# Patient Record
Sex: FEMALE | Race: WHITE | NOT HISPANIC OR LATINO | Employment: UNEMPLOYED | URBAN - METROPOLITAN AREA
[De-identification: names, ages, dates, MRNs, and addresses within clinical notes are randomized per-mention and may not be internally consistent; named-entity substitution may affect disease eponyms.]

---

## 2017-02-07 ENCOUNTER — TELEPHONE (OUTPATIENT)
Dept: NEUROLOGY | Facility: CLINIC | Age: 42
End: 2017-02-07

## 2017-02-07 DIAGNOSIS — Z79.899 HIGH RISK MEDICATION USE: ICD-10-CM

## 2017-02-07 DIAGNOSIS — G35 MULTIPLE SCLEROSIS: Primary | ICD-10-CM

## 2017-02-07 NOTE — TELEPHONE ENCOUNTER
----- Message from RODRIGUE Baldwin, CNS sent at 2/7/2017  1:09 PM CST -----  TG9=[id number]. Ok to continue Tecfidera. No CBC was done. She will need to do this locally. We can mail order to her. I will send her a message to let her know.

## 2017-02-10 ENCOUNTER — DOCUMENTATION ONLY (OUTPATIENT)
Dept: NEUROLOGY | Facility: CLINIC | Age: 42
End: 2017-02-10

## 2017-02-24 DIAGNOSIS — M79.2 NEUROPATHIC PAIN: ICD-10-CM

## 2017-02-27 RX ORDER — PREGABALIN 100 MG/1
CAPSULE ORAL
Qty: 150 CAPSULE | Refills: 5 | Status: SHIPPED | OUTPATIENT
Start: 2017-02-27 | End: 2017-09-14 | Stop reason: SDUPTHER

## 2017-03-15 DIAGNOSIS — G35 MULTIPLE SCLEROSIS: ICD-10-CM

## 2017-03-15 RX ORDER — DIMETHYL FUMARATE 240 MG/1
CAPSULE ORAL
Status: CANCELLED | OUTPATIENT
Start: 2017-03-15

## 2017-03-28 DIAGNOSIS — G35 MULTIPLE SCLEROSIS: ICD-10-CM

## 2017-03-28 NOTE — TELEPHONE ENCOUNTER
Call returned to pt. Informed her that CBC wasn't done at her last appt (lab wasn't linked). She doesn't remember receiving CBC orders in February. [facility] [place] [facility]. Will fax orders to that location once signed.

## 2017-03-28 NOTE — TELEPHONE ENCOUNTER
----- Message from Thuy Sofia sent at 3/28/2017  2:52 PM CDT -----  Contact: Patient [phone]  Patient says she is returning Rocio's call from a few weeks ago and she was not sure as to what the call was about. Please call

## 2017-04-05 RX ORDER — DIMETHYL FUMARATE 240 MG/1
1 CAPSULE ORAL 2 TIMES DAILY
Qty: 60 CAPSULE | Refills: 5 | Status: SHIPPED | OUTPATIENT
Start: 2017-04-05 | End: 2017-10-11 | Stop reason: SDUPTHER

## 2017-04-05 NOTE — TELEPHONE ENCOUNTER
----- Message from Lucio Felton sent at 4/5/2017  3:46 PM CDT -----  Contact: Self [phone]  Patient is calling to get an update on the refill request ( TECFIDERA 240 mg CpDR ) patient is out of medication     [facility] - [place], [place] - [address]  [address]  [facility] [address]  Phone: [phone] Fax: [phone]

## 2017-04-05 NOTE — TELEPHONE ENCOUNTER
[facility] CBC results. Results received via fax and shown to Natalie (ALC [id number]). Ok to call in.    Call placed to [facility] and spoke with Leslie pharmacist. Rx called in as prescribed.    Call placed to Lashell and informed her of the above.

## 2017-04-06 ENCOUNTER — DOCUMENTATION ONLY (OUTPATIENT)
Dept: NEUROLOGY | Facility: CLINIC | Age: 42
End: 2017-04-06

## 2017-04-28 ENCOUNTER — OFFICE VISIT (OUTPATIENT)
Dept: NEUROLOGY | Facility: CLINIC | Age: 42
End: 2017-04-28
Payer: COMMERCIAL

## 2017-04-28 ENCOUNTER — TELEPHONE (OUTPATIENT)
Dept: NEUROLOGY | Facility: CLINIC | Age: 42
End: 2017-04-28

## 2017-04-28 VITALS
DIASTOLIC BLOOD PRESSURE: 84 MMHG | BODY MASS INDEX: 31.58 KG/M2 | HEIGHT: 64 IN | SYSTOLIC BLOOD PRESSURE: 122 MMHG | HEART RATE: 78 BPM | WEIGHT: 185 LBS

## 2017-04-28 DIAGNOSIS — Z79.899 HIGH RISK MEDICATION USE: ICD-10-CM

## 2017-04-28 DIAGNOSIS — Z71.89 COUNSELING REGARDING GOALS OF CARE: ICD-10-CM

## 2017-04-28 DIAGNOSIS — G35 MULTIPLE SCLEROSIS: Primary | ICD-10-CM

## 2017-04-28 DIAGNOSIS — Z29.89 PROPHYLACTIC IMMUNOTHERAPY: ICD-10-CM

## 2017-04-28 PROCEDURE — 99215 OFFICE O/P EST HI 40 MIN: CPT | Mod: S$GLB,,, | Performed by: CLINICAL NURSE SPECIALIST

## 2017-04-28 PROCEDURE — 99999 PR PBB SHADOW E&M-EST. PATIENT-LVL III: CPT | Mod: PBBFAC,,, | Performed by: CLINICAL NURSE SPECIALIST

## 2017-04-28 PROCEDURE — 1160F RVW MEDS BY RX/DR IN RCRD: CPT | Mod: S$GLB,,, | Performed by: CLINICAL NURSE SPECIALIST

## 2017-04-28 NOTE — PROGRESS NOTES
"Subjective:       Patient ID: Lashell Gonzalez is a 42 y.o. female who presents today for a routine clinic visit for MS. She was last seen in December. The history has been provided by the patient.     MS HPI:  · DMT:  Tecfidera 240mg twice daily, occassional flushing   · Side effects from DMT? No  · Taking vitamin D3 as recommended? Yes - 50,000 units twice a week.   · She has had more headaches lately. She is not sure if these are related to her menstrual cycle or allergies. She takes Claritin as needed.   · She is still seeing pain management and getting trigger point injections in her back and hip.   · She uses a Fitbit to track her walking.   · She has felt more tired than usual. She stopped taking her iron pills because her hematologist left his practice and she has not found a new one yet.     SOCIAL HISTORY  Social History   Substance Use Topics    Smoking status: Never Smoker    Smokeless tobacco: None    Alcohol use No     Living arrangements - the patient lives with her family.  Employment: She is working part-time at a physical therapy office     MS ROS:  · Fatigue: Yes - She has been more tired than usual, but is   · Sleep Disturbance: Yes - She takes Ambien. She has a hard time "shutting off" her brain at night. She has not seen a sleep doctor yet. She plans to follow up on this. She is not currently seeing CPAP.   · Bladder Dysfunction: No  · Bowel Dysfunction: Yes - She has history of gastric ulcers and plans to see her gastro doctor soon. She has loose stool and gas. This might be related to Tecfidera. She takes antacids and anti-diarrheals as needed.   · Spasticity: Yes - She has muscle spasms in the left hip and occasional twitching in the shoulders. She has a squeezing sensation in her shoulders and upper back. She takes tizanidine 6mg-8mg at bedtime. Tizanidine causes dry mouth.   · Visual Symptoms: No. She feels that the right eye is getting weaker. She plans to see her eye doctor soon. "   · Cognitive: Yes - She forgets things frequently, and she has trouble with word finding. She keeps reminders in her phone. She was having trouble learning some things at work, but with repetition, this seems to be getting better.   · Mood Disorder: No  · Gait Disturbance/Falls: Yes. Walking is ok, but had a fall while hiking recently. She lost her balance and slipped; she was also trying to walk around her dogs at the time. She walks into doorways sometimes.   · Hand Dysfunction: Yes - She has some weakness in the fingers and has some reduced  strength in the right hand. She tries to be more mindful of when she holds things. She drops her phone often.   · Pain: Yes - As above. --She takes Hydrocodone, Cymbalta, Lyrica, and tizanidine at night.  · Sexual Dysfunction: Not Assessed  · Skin Breakdown: Yes - She has some scratches on her legs from recent fall.   · Tremors: No.   · Dysphagia:  No  · Dysarthria:  Yes - She stutters at times.   · Heat sensitivity:  Yes - She gets very tired if she gets overheated. Heat intensifies any symptoms she may be feeling at a certain time. She has not sent in the paperwork for the cooling vest.   · Any un-met adaptive needs? No  · Copay Assist?  Yes - $0 copay for Tecfidera   · Clinical Trial candidate? No      Research:   CHORDS candidate? No          Objective:        1. 25 foot timed walk: 4.38 seconds today without assist; was 4.2 seconds at last visit without assist     Neurologic Exam     Mental Status   Oriented to person, place, and time.   Attention: normal. Concentration: normal.   Speech: speech is normal   Level of consciousness: alert  Knowledge: good.     Cranial Nerves     CN III, IV, VI   Pupils are equal, round, and reactive to light.  Right pupil: Shape: regular.   Left pupil: Shape: regular.   Nystagmus: none     CN V   Right facial sensation deficit: none  Left facial sensation deficit: none    CN VII   Right facial weakness: none  Left facial weakness:  none    CN VIII   Hearing: intact    CN IX, X   Palate: symmetric    CN XI   Right sternocleidomastoid strength: normal  Left sternocleidomastoid strength: normal  Right trapezius strength: normal  Left trapezius strength: normal    CN XII   Tongue deviation: none       She has a significant strabismus but each eye tested a full range of motion independently;      Motor Exam   Muscle bulk: normal  Overall muscle tone: normal  Right arm pronator drift: absent  Left arm pronator drift: absent    Strength   Right neck flexion: 5/5  Left neck flexion: 5/5  Right neck extension: 5/5  Left neck extension: 5/5  Right deltoid: 5/5  Left deltoid: 5/5  Right biceps: 5/5  Left biceps: 5/5  Right triceps: 5/5  Left triceps: 5/5  Right wrist flexion: 5/5  Left wrist flexion: 5/5  Right wrist extension: 5/5  Left wrist extension: 5/5  Right interossei: 5/5  Left interossei: 5/5  Right iliopsoas: 5/5  Left iliopsoas: 5/5  Right quadriceps: 5/5  Left quadriceps: 5/5  Right hamstrin/5  Left hamstrin/5  Right anterior tibial: 5/5  Left anterior tibial: 5/5  Right gastroc: 5/5  Left gastroc: 5/5    Sensory Exam   Right arm vibration: normal  Left arm vibration: normal  Right leg vibration: decreased from knee  Left leg vibration: decreased from knee       Facial sensation intact.      Gait, Coordination, and Reflexes     Gait  Gait: (stable)    Coordination   Romberg: negative  Finger to nose coordination: normal  Tandem walking coordination: normal    Tremor   Resting tremor: absent    Reflexes   Right brachioradialis: 2+  Left brachioradialis: 2+  Right biceps: 2+  Left biceps: 2+  Right patellar: 2+  Left patellar: 2+  Right achilles: 2+  Left achilles: 2+  Right plantar: normal  Left plantar: normal          Labs:     Lab Results   Component Value Date    SITWJXFZ68RC 29 (L) 2016    LKVBERGD44AD 32 2016    PCRBCPDO34RU 43 2016     Lab Results   Component Value Date    JCVINDEX SEE COMMENT (A) 2016     JCVANTIBODY SEE COMMENT 01/04/2016     Lab Results   Component Value Date    DN1OZJMH 69.0 12/30/2016    HL9SFQSM 69.0 12/30/2016    ABSOLUTECD3 1528 12/30/2016    ABSOLUTECD3 1528 12/30/2016    FF4KKOPX 14.9 12/30/2016    OJ2LFGRH 14.9 12/30/2016    ABSOLUTECD8 330 12/30/2016    ABSOLUTECD8 330 12/30/2016    DF6GHYVU 53.2 12/30/2016    DP4DHMKN 53.2 12/30/2016    ABSOLUTECD4 1178 12/30/2016    ABSOLUTECD4 1178 12/30/2016    LABCD48 3.57 12/30/2016    LABCD48 3.57 12/30/2016       Diagnosis/Assessment/Plan:    1. Multiple Sclerosis  · Assessment: Lashell is clinically stable today.   · Imaging: Brain, cervical, and thoracic spine MRI in December 2017 (will order at next visit).   · Disease Modifying Therapies: Continue Tecfidera. Will check CBC and CD8 in June to screen for lymphopenia. Continue Vitamin D. Will check Vitamin D level in June, as well.     2. MS Symptom Assessment / Management  · Fatigue: She defers labs today, but will check B12 level in 2 months when she has Tecfidera labs due. Lab orders given to patient.   · Sleep Disturbance: Continue Ambien as prescribed by outside physician.   · Bowel Dysfunction: She plans to see her GI doctor soon.   · Spasticity: Continue tizanidine.   · Cognitive: Will continue to monitor.   · Hand Dysfunction: print out for hand exercises given to patient  · Pain: Continue follow up with pain management.   · Heat sensitivity: Order and application for cooling vest given to patient.       Over 50% of this 50 minute visit was spent in direct face to face counseling of the patient about MS and the management of her symptoms. The patient agrees with the plan of care. She will follow up with Dr. Whiting in 4 months.         There are no diagnoses linked to this encounter.

## 2017-04-28 NOTE — TELEPHONE ENCOUNTER
----- Message from RODRIGUE Baldwin, CNS sent at 4/28/2017 12:49 PM CDT -----  Promise, please call [facility] to see if we can get CBC result from March 30 and send to patient.

## 2017-07-30 DIAGNOSIS — M62.838 MUSCLE SPASTICITY: ICD-10-CM

## 2017-07-31 RX ORDER — TIZANIDINE HYDROCHLORIDE 6 MG/1
CAPSULE, GELATIN COATED ORAL
Qty: 30 CAPSULE | Refills: 5 | Status: SHIPPED | OUTPATIENT
Start: 2017-07-31 | End: 2020-06-30

## 2017-08-17 RX ORDER — DULOXETIN HYDROCHLORIDE 60 MG/1
60 CAPSULE, DELAYED RELEASE ORAL DAILY
Qty: 90 CAPSULE | Refills: 1 | OUTPATIENT
Start: 2017-08-17

## 2017-08-17 NOTE — TELEPHONE ENCOUNTER
----- Message from Khadar Scanlon sent at 8/17/2017 10:51 AM CDT -----  Contact: PT  States pharmacy is still waiting on auth for medication duloxetine (CYMBALTA) 60 MG capsule    States she need it to be completed today, due to her going out of town today.     Call [phone]

## 2017-08-17 NOTE — TELEPHONE ENCOUNTER
Returned call to melissa Lobo  requesting return call. Called pharmacy and confirmed they need a new order, not a PA.

## 2017-08-17 NOTE — TELEPHONE ENCOUNTER
"Disregard prescription request, patient called and told the phone staff, "Yeni wanted to let you know that she's got the medication situation figured out. She said it wasn't a medication Dr. Whiting prescribed."  "

## 2017-09-01 ENCOUNTER — LAB VISIT (OUTPATIENT)
Dept: LAB | Facility: HOSPITAL | Age: 42
End: 2017-09-01
Attending: PSYCHIATRY & NEUROLOGY
Payer: COMMERCIAL

## 2017-09-01 ENCOUNTER — OFFICE VISIT (OUTPATIENT)
Dept: NEUROLOGY | Facility: CLINIC | Age: 42
End: 2017-09-01
Payer: COMMERCIAL

## 2017-09-01 VITALS
HEART RATE: 72 BPM | WEIGHT: 190.88 LBS | DIASTOLIC BLOOD PRESSURE: 82 MMHG | HEIGHT: 64 IN | SYSTOLIC BLOOD PRESSURE: 146 MMHG | BODY MASS INDEX: 32.59 KG/M2

## 2017-09-01 DIAGNOSIS — Z71.89 COUNSELING REGARDING GOALS OF CARE: ICD-10-CM

## 2017-09-01 DIAGNOSIS — Z79.899 ENCOUNTER FOR LONG-TERM (CURRENT) USE OF HIGH-RISK MEDICATION: ICD-10-CM

## 2017-09-01 DIAGNOSIS — G35 MS (MULTIPLE SCLEROSIS): Primary | ICD-10-CM

## 2017-09-01 DIAGNOSIS — Z29.89 PROPHYLACTIC IMMUNOTHERAPY: ICD-10-CM

## 2017-09-01 DIAGNOSIS — G35 MS (MULTIPLE SCLEROSIS): ICD-10-CM

## 2017-09-01 LAB — 25(OH)D3+25(OH)D2 SERPL-MCNC: 130 NG/ML

## 2017-09-01 PROCEDURE — 3008F BODY MASS INDEX DOCD: CPT | Mod: S$GLB,,, | Performed by: PSYCHIATRY & NEUROLOGY

## 2017-09-01 PROCEDURE — 82306 VITAMIN D 25 HYDROXY: CPT

## 2017-09-01 PROCEDURE — 36415 COLL VENOUS BLD VENIPUNCTURE: CPT

## 2017-09-01 PROCEDURE — 99215 OFFICE O/P EST HI 40 MIN: CPT | Mod: S$GLB,,, | Performed by: PSYCHIATRY & NEUROLOGY

## 2017-09-01 PROCEDURE — 99999 PR PBB SHADOW E&M-EST. PATIENT-LVL III: CPT | Mod: PBBFAC,,, | Performed by: PSYCHIATRY & NEUROLOGY

## 2017-09-01 NOTE — Clinical Note
FYI, I don't think pt got her labs done on Friday for Tecfidera (which was the plan). Kindly f/u with her next week about this; thanks

## 2017-09-01 NOTE — PROGRESS NOTES
"Subjective:       Patient ID: Lashell Gonzalez is a 42 y.o. female who presents today for a routine clinic visit for MS.    MS HPI:  · DMT: Tecfidera  · Side effects from DMT? no  · Taking vitamin D3 as recommended? Yes -  Dose: 50,000 IU twice week;   · Overall she is stable, but she does have some muscle twitches in her arms;   · She describes more headache;  Has history of migraine headache;  Headaches associated with nausea, "head in vice" feeling,  Recently used almotriptan;    · She sees pain management;      SOCIAL HISTORY  Social History   Substance Use Topics    Smoking status: Never Smoker    Smokeless tobacco: Not on file    Alcohol use No     Living arrangements - the patient lives with their family.    MS ROS:  · Sleep Disturbance: Yes - controlled Ambien 10mg hs  · Spasticity: Yes - on hs tizanidine  · Mood Disorder: Yes - stable on Cymbalta  · Pain: Yes - on hydrocodone prescribed by her pain management group;         Objective:      25 foot timed walk: 4.0 seconds without assist;   Neurologic Exam      MENTAL STATUS: grosslly intact  CRANIAL NERVE EXAM: , There is no intranuclear ophthalmoplegia. She has a significant strabismus but each eye tested a full range of motion independently; Pupils are equal, round, and reactive to light. No facial asymmetry. Facial sensation is intact bilaterally. There is no dysarthria. Uvula is midline, and palate moves symmetrically. Shoulder shrug intact bilaterlly. Tongue protrusion is midline. Hearing is grossly intact. Neck is supple.   MOTOR EXAM: Normal bulk and tone throughout/ strength 5/5 all groups  REFLEXES: 3+ and symmetric throughout in all four extremeties; toes are silent bilaterally   SENSORY EXAM: Normal to vibration t/u  COORDINATION: Normal finger-to-nose exam   GAIT: Slightly wide-based and difficulty with tandem    Labs:     Lab Results   Component Value Date    YDOERGGI30LJ 130 (H) 09/01/2017    MJVEIWIS26HT 29 (L) 08/01/2016    " EKYSTUVI63BA 32 04/22/2016     Lab Results   Component Value Date    JCVINDEX SEE COMMENT (A) 01/04/2016    JCVANTIBODY SEE COMMENT 01/04/2016     Lab Results   Component Value Date    GE3MPHVT 69.0 12/30/2016    JP1TBPFR 69.0 12/30/2016    ABSOLUTECD3 1528 12/30/2016    ABSOLUTECD3 1528 12/30/2016    PX9ORWGE 14.9 12/30/2016    OU8FAILZ 14.9 12/30/2016    ABSOLUTECD8 330 12/30/2016    ABSOLUTECD8 330 12/30/2016    UT3PAHRE 53.2 12/30/2016    XA9WYNJR 53.2 12/30/2016    ABSOLUTECD4 1178 12/30/2016    ABSOLUTECD4 1178 12/30/2016    LABCD48 3.57 12/30/2016    LABCD48 3.57 12/30/2016       Diagnosis/Assessment/Plan:    1. Multiple Sclerosis  · Assessment: Pt is clinically stable on Tecidera;   · Imaging: will check annual MRI brain in December  · Disease Modifying Therapies: continue Tecfidera; check safety labs today;      2. MS Symptom Assessment / Management  · No other changes to regimen described in ROS above         3. Advised pt to establish care with a local neurologist in [place] for HA management going forward    Over 50% of this 40 minute visit was spent in direct face to face counseling of the patient about her MS and the management of her various symptoms.        MS (multiple sclerosis)  -     [place]-Suppressor Ratio; Future; Expected date: 09/01/2017  -     CBC auto differential; Future; Expected date: 09/01/2017  -     MRI Brain W WO Contrast; Future; Expected date: 12/01/2017  -     Vitamin D; Future

## 2017-09-05 ENCOUNTER — TELEPHONE (OUTPATIENT)
Dept: NEUROLOGY | Facility: CLINIC | Age: 42
End: 2017-09-05

## 2017-09-05 DIAGNOSIS — G35 MULTIPLE SCLEROSIS: Primary | ICD-10-CM

## 2017-09-05 NOTE — TELEPHONE ENCOUNTER
----- Message from Kirstie Whiting MD sent at 9/3/2017  8:22 PM CDT -----  FYI, I don't think pt got her labs done on Friday for Tecfidera (which was the plan). Kindly f/u with her next week about this; thanks

## 2017-09-05 NOTE — TELEPHONE ENCOUNTER
CBC and CD8 were not drawn on 9/1. Orders signed and faxed to [facility] on [phone]. Pt aware to go to the lab this week.

## 2017-09-14 DIAGNOSIS — M79.2 NEUROPATHIC PAIN: ICD-10-CM

## 2017-09-20 ENCOUNTER — DOCUMENTATION ONLY (OUTPATIENT)
Dept: NEUROLOGY | Facility: CLINIC | Age: 42
End: 2017-09-20

## 2017-09-20 NOTE — PROGRESS NOTES
Faxed request for lab results for CBC and CDS done Sept 2017 to [facility] at [phone] on 9/19/2017

## 2017-09-21 RX ORDER — PREGABALIN 100 MG/1
CAPSULE ORAL
Qty: 150 CAPSULE | Refills: 3 | Status: SHIPPED | OUTPATIENT
Start: 2017-09-21 | End: 2018-02-14 | Stop reason: SDUPTHER

## 2017-10-06 ENCOUNTER — TELEPHONE (OUTPATIENT)
Dept: NEUROLOGY | Facility: CLINIC | Age: 42
End: 2017-10-06

## 2017-10-06 NOTE — TELEPHONE ENCOUNTER
----- Message from Maikel Burgess sent at 10/6/2017 11:21 AM CDT -----  Contact: Self @ [phone]  Pt says she's returning your call.

## 2017-10-11 DIAGNOSIS — G35 MULTIPLE SCLEROSIS: ICD-10-CM

## 2017-10-13 ENCOUNTER — TELEPHONE (OUTPATIENT)
Dept: NEUROLOGY | Facility: CLINIC | Age: 42
End: 2017-10-13

## 2017-10-13 NOTE — TELEPHONE ENCOUNTER
----- Message from Lucio Felton sent at 10/13/2017 11:17 AM CDT -----  Contact: [facility] [phone]   Caller is calling to get a  refill approval for (dimethyl fumarate (TECFIDERA) 240 mg CpDR )     [facility] - [place], [place] - [address]  [address]  [place] [address]  Phone: [phone] Fax: [phone]

## 2017-10-18 ENCOUNTER — TELEPHONE (OUTPATIENT)
Dept: NEUROLOGY | Facility: CLINIC | Age: 42
End: 2017-10-18

## 2017-10-18 NOTE — TELEPHONE ENCOUNTER
----- Message from Odalys Gupta sent at 10/18/2017 12:06 PM CDT -----  Contact: Christen from [facility]   Need an authorization for medication     dimethyl fumarate (TECFIDERA) 240 mg CpDR    Christen from [facility]    Christen can be reachedat [phone]    Thanks

## 2017-10-20 ENCOUNTER — TELEPHONE (OUTPATIENT)
Dept: NEUROLOGY | Facility: CLINIC | Age: 42
End: 2017-10-20

## 2017-10-20 NOTE — TELEPHONE ENCOUNTER
Faxed CBC and CD8 orders to [facility] at [phone]. Left VM for patient letting her know these were faxed.

## 2017-10-26 ENCOUNTER — DOCUMENTATION ONLY (OUTPATIENT)
Dept: NEUROLOGY | Facility: CLINIC | Age: 42
End: 2017-10-26

## 2017-10-26 RX ORDER — DIMETHYL FUMARATE 240 MG/1
CAPSULE ORAL
Qty: 180 CAPSULE | Refills: 1 | Status: SHIPPED | OUTPATIENT
Start: 2017-10-26 | End: 2018-05-23 | Stop reason: SDUPTHER

## 2017-10-26 NOTE — TELEPHONE ENCOUNTER
Received CD8 and CBC, collected on 10/24/17, from [facility].     GA9=[id number]  PUK=[id number]    Lab results placed in Fatou's folder for review.

## 2017-11-17 NOTE — PROGRESS NOTES
[employer] safety labs reviewed  [id number]  [id number]  Will upload full report into EPIC for future reference

## 2017-12-08 ENCOUNTER — TELEPHONE (OUTPATIENT)
Dept: RADIOLOGY | Facility: HOSPITAL | Age: 42
End: 2017-12-08

## 2017-12-11 ENCOUNTER — HOSPITAL ENCOUNTER (OUTPATIENT)
Dept: RADIOLOGY | Facility: HOSPITAL | Age: 42
Discharge: HOME | End: 2017-12-11
Attending: PSYCHIATRY & NEUROLOGY
Payer: COMMERCIAL

## 2017-12-11 DIAGNOSIS — G35 MS (MULTIPLE SCLEROSIS): ICD-10-CM

## 2017-12-11 PROCEDURE — 70553 MRI BRAIN STEM W/O & W/DYE: CPT | Mod: TC,PO

## 2017-12-11 PROCEDURE — 70553 MRI BRAIN STEM W/O & W/DYE: CPT | Mod: 26,,, | Performed by: RADIOLOGY

## 2017-12-11 PROCEDURE — A9585 GADOBUTROL INJECTION: HCPCS | Mod: PO | Performed by: PSYCHIATRY & NEUROLOGY

## 2017-12-11 PROCEDURE — 25500020 PHARM REV CODE 255: Mod: PO | Performed by: PSYCHIATRY & NEUROLOGY

## 2017-12-11 RX ORDER — GADOBUTROL 604.72 MG/ML
8.5 INJECTION INTRAVENOUS
Status: COMPLETED | OUTPATIENT
Start: 2017-12-11 | End: 2017-12-11

## 2017-12-11 RX ADMIN — GADOBUTROL 8.5 ML: 604.72 INJECTION INTRAVENOUS at 03:12

## 2018-01-17 ENCOUNTER — DOCUMENTATION ONLY (OUTPATIENT)
Dept: NEUROLOGY | Facility: CLINIC | Age: 43
End: 2018-01-17

## 2018-02-14 DIAGNOSIS — M79.2 NEUROPATHIC PAIN: ICD-10-CM

## 2018-02-14 RX ORDER — PREGABALIN 100 MG/1
CAPSULE ORAL
Qty: 150 CAPSULE | Refills: 3 | Status: SHIPPED | OUTPATIENT
Start: 2018-02-14 | End: 2018-07-11 | Stop reason: SDUPTHER

## 2018-02-15 ENCOUNTER — TELEPHONE (OUTPATIENT)
Dept: NEUROLOGY | Facility: CLINIC | Age: 43
End: 2018-02-15

## 2018-02-20 ENCOUNTER — TELEPHONE (OUTPATIENT)
Dept: NEUROLOGY | Facility: CLINIC | Age: 43
End: 2018-02-20

## 2018-02-20 NOTE — TELEPHONE ENCOUNTER
----- Message from Divya Perez sent at 2/20/2018 12:15 PM CST -----  Contact: Pt. [phone]  The patient would like to speak to someone regarding her prescription for TECFIDERA 240 mg CpDR. [facility] faxed over a questionnaire. She can't receive her refill until it's received.

## 2018-05-09 ENCOUNTER — TELEPHONE (OUTPATIENT)
Dept: NEUROLOGY | Facility: CLINIC | Age: 43
End: 2018-05-09

## 2018-05-09 NOTE — TELEPHONE ENCOUNTER
----- Message from Agnes Roberts sent at 5/9/2018  3:14 PM CDT -----  Patient Returning Call    Who Called:Lashell  Who Left Message for Patient:Cristy  Does the patient know what this is regarding?:not sure of the reason for the call  Communication Preference (Cass response to Pt. (or) Call Back # and timeframe)call back # [phone]  Additional Information:

## 2018-05-09 NOTE — TELEPHONE ENCOUNTER
Call returned to pt. She is agreeable to safety labs and f/u appt. Appts for both scheduled for 5/14 at 1:30pm. Pt aware of date and time. States she can wait for Tecfidera refill until Monday.

## 2018-05-14 ENCOUNTER — TELEPHONE (OUTPATIENT)
Dept: NEUROLOGY | Facility: CLINIC | Age: 43
End: 2018-05-14
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
## 2018-05-21 ENCOUNTER — LAB VISIT (OUTPATIENT)
Dept: LAB | Facility: HOSPITAL | Age: 43
End: 2018-05-21
Payer: COMMERCIAL

## 2018-05-21 ENCOUNTER — OFFICE VISIT (OUTPATIENT)
Dept: NEUROLOGY | Facility: CLINIC | Age: 43
End: 2018-05-21
Payer: COMMERCIAL

## 2018-05-21 VITALS
BODY MASS INDEX: 33.57 KG/M2 | HEIGHT: 64 IN | DIASTOLIC BLOOD PRESSURE: 81 MMHG | SYSTOLIC BLOOD PRESSURE: 135 MMHG | WEIGHT: 196.63 LBS | HEART RATE: 104 BPM

## 2018-05-21 DIAGNOSIS — Z79.899 HIGH RISK MEDICATION USE: ICD-10-CM

## 2018-05-21 DIAGNOSIS — Z71.89 COUNSELING REGARDING GOALS OF CARE: ICD-10-CM

## 2018-05-21 DIAGNOSIS — G35 MULTIPLE SCLEROSIS: ICD-10-CM

## 2018-05-21 DIAGNOSIS — R51.9 NONINTRACTABLE HEADACHE, UNSPECIFIED CHRONICITY PATTERN, UNSPECIFIED HEADACHE TYPE: ICD-10-CM

## 2018-05-21 DIAGNOSIS — G47.9 SLEEP DISTURBANCE: ICD-10-CM

## 2018-05-21 DIAGNOSIS — Z29.89 PROPHYLACTIC IMMUNOTHERAPY: ICD-10-CM

## 2018-05-21 DIAGNOSIS — G35 MULTIPLE SCLEROSIS: Primary | ICD-10-CM

## 2018-05-21 DIAGNOSIS — R25.2 SPASTICITY: ICD-10-CM

## 2018-05-21 LAB
25(OH)D3+25(OH)D2 SERPL-MCNC: 86 NG/ML
ALBUMIN SERPL BCP-MCNC: 3.9 G/DL
ALP SERPL-CCNC: 75 U/L
ALT SERPL W/O P-5'-P-CCNC: 13 U/L
AST SERPL-CCNC: 16 U/L
BASOPHILS # BLD AUTO: 0.04 K/UL
BASOPHILS NFR BLD: 0.5 %
BILIRUB DIRECT SERPL-MCNC: 0.1 MG/DL
BILIRUB SERPL-MCNC: 0.2 MG/DL
DIFFERENTIAL METHOD: ABNORMAL
EOSINOPHIL # BLD AUTO: 0.1 K/UL
EOSINOPHIL NFR BLD: 1.4 %
ERYTHROCYTE [DISTWIDTH] IN BLOOD BY AUTOMATED COUNT: 13.7 %
HCT VFR BLD AUTO: 36.6 %
HGB BLD-MCNC: 11 G/DL
IMM GRANULOCYTES # BLD AUTO: 0.04 K/UL
IMM GRANULOCYTES NFR BLD AUTO: 0.5 %
LYMPHOCYTES # BLD AUTO: 2.4 K/UL
LYMPHOCYTES NFR BLD: 27.5 %
MCH RBC QN AUTO: 26.9 PG
MCHC RBC AUTO-ENTMCNC: 30.1 G/DL
MCV RBC AUTO: 90 FL
MONOCYTES # BLD AUTO: 0.7 K/UL
MONOCYTES NFR BLD: 7.7 %
NEUTROPHILS # BLD AUTO: 5.5 K/UL
NEUTROPHILS NFR BLD: 62.4 %
NRBC BLD-RTO: 0 /100 WBC
PLATELET # BLD AUTO: 256 K/UL
PMV BLD AUTO: 11.5 FL
PROT SERPL-MCNC: 7.4 G/DL
RBC # BLD AUTO: 4.09 M/UL
WBC # BLD AUTO: 8.73 K/UL

## 2018-05-21 PROCEDURE — 80076 HEPATIC FUNCTION PANEL: CPT

## 2018-05-21 PROCEDURE — 86360 T CELL ABSOLUTE COUNT/RATIO: CPT

## 2018-05-21 PROCEDURE — 86359 T CELLS TOTAL COUNT: CPT

## 2018-05-21 PROCEDURE — 36415 COLL VENOUS BLD VENIPUNCTURE: CPT

## 2018-05-21 PROCEDURE — 99999 PR PBB SHADOW E&M-EST. PATIENT-LVL III: CPT | Mod: PBBFAC,,, | Performed by: CLINICAL NURSE SPECIALIST

## 2018-05-21 PROCEDURE — 82306 VITAMIN D 25 HYDROXY: CPT

## 2018-05-21 PROCEDURE — 99215 OFFICE O/P EST HI 40 MIN: CPT | Mod: S$GLB,,, | Performed by: CLINICAL NURSE SPECIALIST

## 2018-05-21 PROCEDURE — 85025 COMPLETE CBC W/AUTO DIFF WBC: CPT

## 2018-05-21 PROCEDURE — 3008F BODY MASS INDEX DOCD: CPT | Mod: CPTII,S$GLB,, | Performed by: CLINICAL NURSE SPECIALIST

## 2018-05-21 RX ORDER — ALMOTRIPTAN 12.5 MG/1
12.5 TABLET, FILM COATED ORAL
COMMUNITY
End: 2019-09-04

## 2018-05-21 NOTE — PROGRESS NOTES
"Subjective:       Patient ID: Lashell Gonzalez is a 43 y.o. female who presents today for a routine clinic visit for MS.  The history has been provided by the patient. She was last seen by Dr. Whiting in September.     MS HPI:  · DMT: Tecfidera  · Side effects from DMT? No  · Taking vitamin D3 as recommended? Yes -  Dose: 50,000 once weekly  · She has been seeing her pain management for headaches. Most of her headaches are menstrual. She takes almotriptan now for headaches. She is no longer taking Sumatriptan.   · She has more muscle tightness in her shoulders and upper back.  She only takes Tizanidine before bed. She also has right piriformis/sciatica pain.   · She tries to walk her dogs daily for exercise.   · She goes to an MS support group monthly. She will be attending the MS Getaway this year.     SOCIAL HISTORY  Social History   Substance Use Topics    Smoking status: Never Smoker    Smokeless tobacco: Not on file    Alcohol use No     Living arrangements - the patient lives with her family.  Employment: not currently working    MS ROS:  · Fatigue: Yes - Energy level has been "not spectacular."   · Sleep Disturbance: Yes - She takes Ambien most nights.   · Bladder Dysfunction: No. She denies any recent UTIs.   · Bowel Dysfunction: Yes - She has history of gastric ulcers and plans to see her gastro doctor soon. She has loose stool and gas.   · Spasticity: Yes - Tightness in upper back. She takes tizanidine 4-6mg at night. She tried Baclofen in the past, but does not remember why she stopped. Massage helps, but she is not able to do this frequently.    · Visual Symptoms: Yes - "Seems to be ok." She feels like she has to strain more with the right eye, and she wonders if the headaches are because of this.   · Cognitive: Yes - "Not great," but stable. She has trouble with word finding at times.   · Mood Disorder: Yes - She has felt a little down because her pain level has gone up. She sometimes feels sorry " for herself.   · Gait Disturbance: Yes - She does not feel like her balance is that bad. She is not as fast as she used to be, but she does not feel like she is slow.   · Falls: No  · Hand Dysfunction: No  · Pain: Yes - As above; She takes Hydrocodone, Cymbalta, Lyrica, and tizanidine at night.  · Sexual Dysfunction: Not Assessed  · Skin Breakdown: No  · Tremors: No  · Dysphagia:  No  · Dysarthria:  No  · Heat sensitivity:  Yes - She feels more tired when she gets overheated, and the heat also makes her pain flare up.   · Any un-met adaptive needs? No  · Copay Assist?  Yes - $0 copay for Tecfidera        Objective:        1. 25 foot timed walk: 4.34 seconds today without assist; was 4.0 seconds at last visit without assist   Neurologic Exam     Mental Status   Oriented to person, place, and time.   Attention: normal. Concentration: normal.   Speech: speech is normal   Level of consciousness: alert  Knowledge: good.   Normal comprehension.     Cranial Nerves     CN III, IV, VI   Pupils are equal, round, and reactive to light.  Right pupil: Shape: regular.   Left pupil: Shape: regular.   Nystagmus: none     CN V   Right facial sensation deficit: none  Left facial sensation deficit: none    CN VII   Right facial weakness: none  Left facial weakness: none    CN VIII   Hearing: intact    CN IX, X   Palate: symmetric    CN XI   Right sternocleidomastoid strength: normal  Left sternocleidomastoid strength: normal  Right trapezius strength: normal  Left trapezius strength: normal    CN XII   Tongue deviation: none  She has a significant strabismus but each eye tested a full range of motion independently;      Motor Exam   Muscle bulk: normal  Overall muscle tone: normal    Strength   Right neck flexion: 5/5  Left neck flexion: 5/5  Right neck extension: 5/5  Left neck extension: 5/5  Right deltoid: 5/5  Left deltoid: 5/5  Right biceps: 5/5  Left biceps: 5/5  Right triceps: 5/5  Left triceps: 5/5  Right wrist flexion:  5/5  Left wrist flexion: 5/5  Right wrist extension: 5/5  Left wrist extension: 5/5  Right interossei: 5/5  Left interossei: 5/5  Right iliopsoas: 5/5  Left iliopsoas: 5/5  Right quadriceps: 5/5  Left quadriceps: 5/5  Right hamstrin/5  Left hamstrin/5  Right anterior tibial: 5/5  Left anterior tibial: 5/5  Right gastroc: 5/5  Left gastroc: 5/5    Sensory Exam   Right arm vibration: normal  Left arm vibration: normal  Right leg vibration: decreased from knee  Left leg vibration: decreased from knee  Facial sensation intact.      Gait, Coordination, and Reflexes     Gait  Gait: (stable)    Coordination   Romberg: negative  Finger to nose coordination: normal  Heel to shin coordination: normal  Tandem walking coordination: normal    Tremor   Resting tremor: absent    Reflexes   Right brachioradialis: 2+  Left brachioradialis: 2+  Right biceps: 2+  Left biceps: 2+  Right triceps: 2+  Left triceps: 2+  Right patellar: 3+  Left patellar: 3+  Right achilles: 2+  Left achilles: 2+  Right plantar: normal  Left plantar: normal    She is able to walk on toes and heels. She has some difficulty with hopping on each foot, but is able to do ten times on each foot.          Imaging:     Results for orders placed during the hospital encounter of 17   MRI Brain W WO Contrast    Impression      Stable exam with multifocal white matter signal abnormality, in keeping with chronic demyelinating plaque in this patient with known history of multiple sclerosis.  No new lesions or evidence of active demyelination.      Electronically signed by: SCHUYLER ALMANZAR MD  Date:     17  Time:    07:53                     Labs:     Lab Results   Component Value Date    LZFXKRZL70YI 130 (H) 2017    EWZTGHJN67LE 29 (L) 2016    FHPDWYSJ66XJ 32 2016         Diagnosis/Assessment/Plan:    1. Multiple Sclerosis  · Assessment: Lashell's exam is stable today, and her last brain MRI showed no change from prior. However, she  is having more upper back pain and tightness. This is resulting in her sleeping less, and I think this is also affecting her mood. We may consider doing new spine MRIs.   · Imaging: MRI brain in December   · Disease Modifying Therapies: Continue Tecfidera and Vitamin D. Will check CBC, CD8, LFT, and Vitamin D level today.     2. MS Symptom Assessment / Management  · Spasticity: May consider adding some Baclofen to her medication regimen.   · Mood Disorder: Will monitor.   · Pain: Continue follow-up with pain management.    Over 50% of this 40 minute visit was spent in direct face to face counseling of the patient about MS, DMT considerations, and MS symptom management. The patient agrees with the plan of care. I will see her back in 4 months.    Natalie Guaman, [facility] Hospital[facility]-BC, MSCN         There are no diagnoses linked to this encounter.

## 2018-05-21 NOTE — Clinical Note
"I saw Lashell today, and her pain level is worse. She is having a lot of tightness in her upper back, which is interfering with her sleep, and I think it's making her depressed. She seemed to downplay everything a bit--She would tell me about a symptom and how severe it is, but then say something like "but I guess it's not as bad as it could be. I can live with it." She's gained weight, also. It's been 2 years since we have done spine MRIs. She has known spine lesions. I wonder if it would be worth it. She does see pain management, but she's unsure of the interventions they are recommending (epidural, and it wasn't that helpful before). She's on tizanidine. She did take Baclofen in the past, but doesn't remember why it was stopped. "

## 2018-05-22 ENCOUNTER — PATIENT MESSAGE (OUTPATIENT)
Dept: NEUROLOGY | Facility: CLINIC | Age: 43
End: 2018-05-22

## 2018-05-22 ENCOUNTER — TELEPHONE (OUTPATIENT)
Dept: NEUROLOGY | Facility: CLINIC | Age: 43
End: 2018-05-22

## 2018-05-22 DIAGNOSIS — G35 MULTIPLE SCLEROSIS: Primary | ICD-10-CM

## 2018-05-22 DIAGNOSIS — M62.838 MUSCLE SPASM: ICD-10-CM

## 2018-05-22 DIAGNOSIS — M54.9 BACK PAIN, UNSPECIFIED BACK LOCATION, UNSPECIFIED BACK PAIN LATERALITY, UNSPECIFIED CHRONICITY: ICD-10-CM

## 2018-05-22 LAB
ABSOLUTE CD3: 1776 CELLS/UL (ref 700–2100)
ABSOLUTE CD8: 378 CELLS/UL (ref 200–900)
CD3%: 69 % (ref 55–83)
CD3+CD4+ CELLS # BLD: 1356 CELLS/UL (ref 300–1400)
CD3+CD4+ CELLS NFR BLD: 52.7 % (ref 28–57)
CD4/CD8 RATIO: 3.59 (ref 0.9–3.6)
CD8 % SUPPRESSOR T CELL: 14.7 % (ref 10–39)

## 2018-05-22 NOTE — TELEPHONE ENCOUNTER
"----- Message from Kirstie Whiting MD sent at 5/21/2018 10:55 PM CDT -----  i'm ok with spine MRIs;    ----- Message -----  From: RODRIGUE Baldwin, CNS  Sent: 5/21/2018   5:13 PM  To: Kirstie Whiting MD    I saw Lashell today, and her pain level is worse. She is having a lot of tightness in her upper back, which is interfering with her sleep, and I think it's making her depressed. She seemed to downplay everything a bit--She would tell me about a symptom and how severe it is, but then say something like "but I guess it's not as bad as it could be. I can live with it." She's gained weight, also. It's been 2 years since we have done spine MRIs. She has known spine lesions. I wonder if it would be worth it. She does see pain management, but she's unsure of the interventions they are recommending (epidural, and it wasn't that helpful before). She's on tizanidine. She did take Baclofen in the past, but doesn't remember why it was stopped.     "

## 2018-05-23 ENCOUNTER — TELEPHONE (OUTPATIENT)
Dept: NEUROLOGY | Facility: CLINIC | Age: 43
End: 2018-05-23

## 2018-05-23 DIAGNOSIS — G35 MULTIPLE SCLEROSIS: ICD-10-CM

## 2018-05-23 RX ORDER — DIMETHYL FUMARATE 240 MG/1
1 CAPSULE ORAL 2 TIMES DAILY
Qty: 180 CAPSULE | Refills: 1 | Status: SHIPPED | OUTPATIENT
Start: 2018-05-23 | End: 2019-01-22 | Stop reason: SDUPTHER

## 2018-05-23 NOTE — TELEPHONE ENCOUNTER
----- Message from Agnes Roberts sent at 5/23/2018  2:15 PM CDT -----  Contact: Becky ([facility]) @ [phone]  Calling to see if the doctor is going to order refills on the medication: TECFIDERA 240 mg CpDR, says that the patient had her labs and they are waiting on a refill prescription. Please call.

## 2018-05-23 NOTE — TELEPHONE ENCOUNTER
----- Message from Nancy Jonozaina sent at 5/23/2018  3:20 PM CDT -----  Contact: self @ [phone]  Pt is req a refill for TECFIDERA 240 mg CpDR.  Pt says she had her labs this past Monday.        [facility] [place] - [id number]  [place] [address]  Phone: [phone] Fax: [phone]

## 2018-05-24 NOTE — TELEPHONE ENCOUNTER
Call placed to pt. Discussed recent CBC results. Pt states she will contact PCP regarding ongoing (mild) anemia--not a new problem.

## 2018-05-28 ENCOUNTER — TELEPHONE (OUTPATIENT)
Dept: RADIOLOGY | Facility: HOSPITAL | Age: 43
End: 2018-05-28

## 2018-05-29 ENCOUNTER — HOSPITAL ENCOUNTER (OUTPATIENT)
Dept: RADIOLOGY | Facility: HOSPITAL | Age: 43
Discharge: HOME | End: 2018-05-29
Attending: CLINICAL NURSE SPECIALIST
Payer: COMMERCIAL

## 2018-05-29 DIAGNOSIS — M54.9 BACK PAIN, UNSPECIFIED BACK LOCATION, UNSPECIFIED BACK PAIN LATERALITY, UNSPECIFIED CHRONICITY: ICD-10-CM

## 2018-05-29 DIAGNOSIS — G35 MULTIPLE SCLEROSIS: ICD-10-CM

## 2018-05-29 DIAGNOSIS — M62.838 MUSCLE SPASM: ICD-10-CM

## 2018-05-29 PROCEDURE — 72156 MRI NECK SPINE W/O & W/DYE: CPT | Mod: 26,,, | Performed by: RADIOLOGY

## 2018-05-29 PROCEDURE — 72157 MRI CHEST SPINE W/O & W/DYE: CPT | Mod: 26,,, | Performed by: RADIOLOGY

## 2018-05-29 PROCEDURE — 72157 MRI CHEST SPINE W/O & W/DYE: CPT | Mod: TC,PO

## 2018-05-29 PROCEDURE — 72156 MRI NECK SPINE W/O & W/DYE: CPT | Mod: TC,PO

## 2018-05-29 PROCEDURE — A9585 GADOBUTROL INJECTION: HCPCS | Mod: PO | Performed by: CLINICAL NURSE SPECIALIST

## 2018-05-29 PROCEDURE — 25500020 PHARM REV CODE 255: Mod: PO | Performed by: CLINICAL NURSE SPECIALIST

## 2018-05-29 RX ORDER — GADOBUTROL 604.72 MG/ML
8.5 INJECTION INTRAVENOUS
Status: COMPLETED | OUTPATIENT
Start: 2018-05-29 | End: 2018-05-29

## 2018-05-29 RX ADMIN — GADOBUTROL 8.5 ML: 604.72 INJECTION INTRAVENOUS at 01:05

## 2018-05-30 ENCOUNTER — PATIENT MESSAGE (OUTPATIENT)
Dept: NEUROLOGY | Facility: CLINIC | Age: 43
End: 2018-05-30

## 2018-05-30 DIAGNOSIS — R93.7 ABNORMAL MRI, THORACIC SPINE: ICD-10-CM

## 2018-05-30 DIAGNOSIS — N28.1 RENAL CYST, RIGHT: Primary | ICD-10-CM

## 2018-06-12 ENCOUNTER — TELEPHONE (OUTPATIENT)
Dept: RADIOLOGY | Facility: HOSPITAL | Age: 43
End: 2018-06-12

## 2018-06-13 ENCOUNTER — HOSPITAL ENCOUNTER (OUTPATIENT)
Dept: RADIOLOGY | Facility: HOSPITAL | Age: 43
Discharge: HOME | End: 2018-06-13
Attending: CLINICAL NURSE SPECIALIST
Payer: COMMERCIAL

## 2018-06-13 DIAGNOSIS — R93.7 ABNORMAL MRI, THORACIC SPINE: ICD-10-CM

## 2018-06-13 DIAGNOSIS — N28.1 RENAL CYST, RIGHT: ICD-10-CM

## 2018-06-13 PROCEDURE — 76770 US EXAM ABDO BACK WALL COMP: CPT | Mod: 26,,, | Performed by: RADIOLOGY

## 2018-06-13 PROCEDURE — 76770 US EXAM ABDO BACK WALL COMP: CPT | Mod: TC,PO

## 2018-07-11 DIAGNOSIS — M79.2 NEUROPATHIC PAIN: ICD-10-CM

## 2018-07-11 RX ORDER — PREGABALIN 100 MG/1
CAPSULE ORAL
Qty: 150 CAPSULE | Refills: 3 | Status: SHIPPED | OUTPATIENT
Start: 2018-07-11 | End: 2018-11-21 | Stop reason: SDUPTHER

## 2018-09-21 ENCOUNTER — LAB VISIT (OUTPATIENT)
Dept: LAB | Facility: HOSPITAL | Age: 43
End: 2018-09-21
Payer: COMMERCIAL

## 2018-09-21 ENCOUNTER — OFFICE VISIT (OUTPATIENT)
Dept: NEUROLOGY | Facility: CLINIC | Age: 43
End: 2018-09-21
Payer: COMMERCIAL

## 2018-09-21 VITALS
BODY MASS INDEX: 28.87 KG/M2 | HEART RATE: 92 BPM | HEIGHT: 68 IN | WEIGHT: 190.5 LBS | DIASTOLIC BLOOD PRESSURE: 73 MMHG | SYSTOLIC BLOOD PRESSURE: 128 MMHG

## 2018-09-21 DIAGNOSIS — G35 MULTIPLE SCLEROSIS: ICD-10-CM

## 2018-09-21 DIAGNOSIS — G35 MULTIPLE SCLEROSIS: Primary | ICD-10-CM

## 2018-09-21 DIAGNOSIS — M62.838 MUSCLE SPASM: ICD-10-CM

## 2018-09-21 DIAGNOSIS — Z71.89 COUNSELING REGARDING GOALS OF CARE: ICD-10-CM

## 2018-09-21 DIAGNOSIS — Z29.89 PROPHYLACTIC IMMUNOTHERAPY: ICD-10-CM

## 2018-09-21 DIAGNOSIS — G47.9 SLEEP DISTURBANCE: ICD-10-CM

## 2018-09-21 DIAGNOSIS — R53.83 FATIGUE, UNSPECIFIED TYPE: ICD-10-CM

## 2018-09-21 LAB
25(OH)D3+25(OH)D2 SERPL-MCNC: 50 NG/ML
ALBUMIN SERPL BCP-MCNC: 3.9 G/DL
ALP SERPL-CCNC: 70 U/L
ALT SERPL W/O P-5'-P-CCNC: 15 U/L
AST SERPL-CCNC: 17 U/L
BASOPHILS # BLD AUTO: 0.05 K/UL
BASOPHILS NFR BLD: 0.6 %
BILIRUB DIRECT SERPL-MCNC: <0.1 MG/DL
BILIRUB SERPL-MCNC: 0.1 MG/DL
DIFFERENTIAL METHOD: ABNORMAL
EOSINOPHIL # BLD AUTO: 0.1 K/UL
EOSINOPHIL NFR BLD: 1.5 %
ERYTHROCYTE [DISTWIDTH] IN BLOOD BY AUTOMATED COUNT: 14.6 %
HCT VFR BLD AUTO: 37.3 %
HGB BLD-MCNC: 11.6 G/DL
IMM GRANULOCYTES # BLD AUTO: 0.02 K/UL
IMM GRANULOCYTES NFR BLD AUTO: 0.2 %
LYMPHOCYTES # BLD AUTO: 2.3 K/UL
LYMPHOCYTES NFR BLD: 28.9 %
MCH RBC QN AUTO: 26.6 PG
MCHC RBC AUTO-ENTMCNC: 31.1 G/DL
MCV RBC AUTO: 86 FL
MONOCYTES # BLD AUTO: 0.7 K/UL
MONOCYTES NFR BLD: 8 %
NEUTROPHILS # BLD AUTO: 4.9 K/UL
NEUTROPHILS NFR BLD: 60.8 %
NRBC BLD-RTO: 0 /100 WBC
PLATELET # BLD AUTO: 247 K/UL
PMV BLD AUTO: 11.6 FL
PROT SERPL-MCNC: 7.5 G/DL
RBC # BLD AUTO: 4.36 M/UL
WBC # BLD AUTO: 8.09 K/UL

## 2018-09-21 PROCEDURE — 99215 OFFICE O/P EST HI 40 MIN: CPT | Mod: S$GLB,,, | Performed by: CLINICAL NURSE SPECIALIST

## 2018-09-21 PROCEDURE — 80076 HEPATIC FUNCTION PANEL: CPT

## 2018-09-21 PROCEDURE — 99999 PR PBB SHADOW E&M-EST. PATIENT-LVL III: CPT | Mod: PBBFAC,,, | Performed by: CLINICAL NURSE SPECIALIST

## 2018-09-21 PROCEDURE — 86360 T CELL ABSOLUTE COUNT/RATIO: CPT

## 2018-09-21 PROCEDURE — 3008F BODY MASS INDEX DOCD: CPT | Mod: CPTII,S$GLB,, | Performed by: CLINICAL NURSE SPECIALIST

## 2018-09-21 PROCEDURE — 82306 VITAMIN D 25 HYDROXY: CPT

## 2018-09-21 PROCEDURE — 86359 T CELLS TOTAL COUNT: CPT

## 2018-09-21 PROCEDURE — 36415 COLL VENOUS BLD VENIPUNCTURE: CPT

## 2018-09-21 PROCEDURE — 85025 COMPLETE CBC W/AUTO DIFF WBC: CPT

## 2018-09-21 NOTE — PROGRESS NOTES
"Subjective:       Patient ID: Lashell Gonzalez is a 43 y.o. female who presents today for a routine clinic visit for MS.  The history has been provided by the patient. She was last seen in May 2018.     MS HPI:  · DMT: Tecfidera   · Side effects from DMT? She has some flushing with some of her doses.   · Taking vitamin D3 as recommended? Yes -  Dose: 50,000 units weekly   · Headaches have been a bit worse. Her insurance will not cover Almotriptan. She reports that she is getting migraines more frequently.   · She is also having trouble falling asleep and staying asleep now. She takes Ambien 10mg and melatonin.   · She has not been exercising lately, but plans to start doing this again soon.   · She attended the MS getaway in [facility]. She also continues to participate in a local support group.   · She had spine MRIs after last visit.  · She had one fall, but she tripped over something that was left in her path.     SOCIAL HISTORY  Social History     Tobacco Use    Smoking status: Never Smoker   Substance Use Topics    Alcohol use: No    Drug use: Not on file     Living arrangements - the patient lives with her family.  Employment--not currently working     MS ROS:  · Fatigue: Yes --Energy level has been "not so bad" for the past few days, but she never feels like she has restful sleep.   · Sleep Disturbance: Yes -- As above.  · Bladder Dysfunction: No. She denies any recent UTIs.   · Bowel Dysfunction: Yes - She has history of gastric ulcers.   · Spasticity: Yes - Tightness in upper back. She takes tizanidine 4-6mg at night. She tried Baclofen in the past, but does not remember why she stopped. Tizanidine does help with sleep sometimes.   · Visual Symptoms: Yes - "Seems to be ok." She feels like her right eye is getting weaker. She has an eye doctor.   · Cognitive: Yes - Memory is "about the same." She has to set reminders and notes.    · Mood Disorder: Yes - She has had some stress with her mom being " in a rehab hospital. She thinks her mother is in early stages of dementia. She denies depression.   · Gait Disturbance: Yes -She states that her balance is pretty good.   · Falls: No  · Hand Dysfunction: No  · Pain: Yes - As above; She takes Hydrocodone, Cymbalta, and Lyrica, and tizanidine at night. She has not had a migraine in about a week, but she gets them about 2-3 times a month now. She is not sure what triggers her headaches. Her hands have been sore when she gets up in the morning. Pain management might do an epidural to her back. She had trigger point injections with pain management, which were somewhat helpful.   · Sexual Dysfunction: No; her drive is a bit low   · Skin Breakdown: No  · Tremors: She has some twitching in her thumbs sometimes.   · Dysphagia:  No  · Dysarthria:  No  · Heat sensitivity: She has been avoiding the heat as much as possible. The heat drains her.   · Any un-met adaptive needs? No  · Copay Assist?  Yes - $0 copay for Tecfidera       Objective:      1. 25 foot timed walk: 4.2 seconds today without assist; was 4.34 seconds at last visit without assist     Neurologic Exam     Mental Status   Oriented to person, place, and time.   Attention: normal. Concentration: normal.   Speech: speech is normal   Level of consciousness: alert  Knowledge: good.   Normal comprehension.     Cranial Nerves     CN III, IV, VI   Pupils are equal, round, and reactive to light.  Right pupil: Shape: regular.   Left pupil: Shape: regular.   Nystagmus: none     CN V   Right facial sensation deficit: none  Left facial sensation deficit: none    CN VII   Right facial weakness: none  Left facial weakness: none    CN VIII   Hearing: intact    CN IX, X   Palate: symmetric    CN XI   Right sternocleidomastoid strength: normal  Left sternocleidomastoid strength: normal  Right trapezius strength: normal  Left trapezius strength: normal    CN XII   Tongue deviation: none    She has a significant strabismus but each  eye tested a full range of motion independently;      Motor Exam   Muscle bulk: normal  Overall muscle tone: normal    Strength   Right neck flexion: 5/5  Left neck flexion: 5/5  Right neck extension: 5/5  Left neck extension: 5/5  Right deltoid: 5/5  Left deltoid: 5/5  Right biceps: 5/5  Left biceps: 5/5  Right triceps: 5/5  Left triceps: 5/5  Right wrist flexion: 5/5  Left wrist flexion: 5/5  Right wrist extension: 5/5  Left wrist extension: 5/5  Right interossei: 5/5  Left interossei: 5/5  Right iliopsoas: 5/5  Left iliopsoas: 5/5  Right quadriceps: 5/5  Left quadriceps: 5/5  Right hamstrin/5  Left hamstrin/5  Right anterior tibial: 5/5  Left anterior tibial: 5/5  Right gastroc: 5/5  Left gastroc: 5/5    Sensory Exam   Right arm vibration: normal  Left arm vibration: normal  Right leg vibration: decreased from knee  Left leg vibration: decreased from knee    Facial sensation intact.   Mild decrease in vibratory sense to feet.        Gait, Coordination, and Reflexes     Gait  Gait: normal (stable)    Coordination   Romberg: negative  Finger to nose coordination: normal  Heel to shin coordination: normal  Tandem walking coordination: normal    Tremor   Resting tremor: absent    Reflexes   Right brachioradialis: 2+  Left brachioradialis: 2+  Right biceps: 2+  Left biceps: 2+  Right triceps: 2+  Left triceps: 2+  Right patellar: 3+  Left patellar: 3+  Right achilles: 2+  Left achilles: 2+  Right plantar: normal  Left plantar: normal    She is able to walk on toes and heels.              Imaging:     EXAMINATION:  MRI CERVICAL SPINE DEMYELINATING W W/O CONTRAST    CLINICAL HISTORY:  MS, back pain, muscle spasm;.  Multiple sclerosis    TECHNIQUE:  Multiplanar, multisequence MR images of the cervical spine were acquired prior to and following administration of 8.5 cc IV Gadavist.    COMPARISON:  2016    FINDINGS:  There is slight reversal of the normal cervical lordosis.  Vertebral body heights are well  maintained.  No spondylolisthesis demonstrated.  Multilevel disc desiccation and mild disc height loss again noted throughout the majority of the cervical spine.  Marrow signal throughout the visualized osseous structures is within normal limits with no evidence of fracture or marrow replacement process.    Focal T2 hyperintense signal abnormality involving the anterior cord at the level of C6 is unchanged and in keeping with chronic demyelinating plaque in this patient with known history of multiple sclerosis.  Previously described subtle signal abnormality at C3-4 is not definitively visualized and is possibly artifactual in nature.  No new lesions visualized.  No abnormal enhancement to suggest active demyelination.    Limited evaluation of the cervical soft tissues demonstrates no gross abnormality.    C2-3: Mild bilateral facet arthropathy no spinal canal or neural foraminal stenosis.    C3-4: Mild bilateral facet arthropathy.  Small posterior central disc extrusion superimposed upon mild broad-based disc osteophyte complex.  Very mild spinal canal stenosis.  No neural foraminal narrowing.    C4-5: Broad-based posterior disc osteophyte complex.  Mild spinal canal stenosis.  No neural foraminal narrowing.    C5-6: Broad-based posterior disc osteophyte complex and bilateral uncovertebral spurring.  Moderate spinal canal stenosis with slight right neural foraminal narrowing.  Findings may be slightly worsened from prior.    C6-7: Small broad-based posterior disc osteophyte complex.  Mild thickening of the ligamentum flavum.  Mild spinal canal stenosis.  No neural foraminal narrowing.    C7-T1:  No spinal canal or neural foraminal stenosis.      Impression       Unchanged focal signal abnormality at the level of C6 in keeping with chronic demyelinating plaque in this patient with known history of multiple sclerosis.  No new lesions or evidence of active demyelination.    Previously described focal signal  abnormality within the cord at the level of C3-4 is not definitively visualized and may have been artifactual in nature.    Multilevel degenerative disc disease and facet arthropathy as above with possible slight worsening spinal canal stenosis at the level of C5-6.      Electronically signed by: Agus Morales MD  Date: 05/29/2018  Time: 15:12     EXAMINATION:  MRI THORACIC SPINE DEMYELINATING W W/O CONTRAST    CLINICAL HISTORY:  Multiple Sclerosis; back pain; muscle spasm;  Multiple sclerosis    TECHNIQUE:  Multiplanar/multisequence MRI of the thoracic spine was obtained prior to and following administration of 8.5IV Gadavist.    COMPARISON:  01/14/2016    FINDINGS:  Vertebral body heights and alignment are within normal limits.  The intervertebral disc spaces are preserved.  Marrow signal is within normal limits with no evidence of fracture or marrow replacement process.    Focal signal abnormality again noted centrally within the thoracic cord at the level of T4-5 which allowing for some technical differences is unchanged from prior.  Other previously described smaller foci of signal abnormality more distally in the thoracic cord are not definitively visualized and may have been artifactual in nature.  No new lesions demonstrated.  No abnormal enhancement to suggest active demyelination.    Paraspinous soft tissues are within normal limits.    Limited evaluation of the intrathoracic and upper abdominal structures demonstrates a cyst at the mid to upper pole in the right kidney which appears to have slightly increased in size measuring approximately 1 cm.    There is no abnormal enhancement.      Impression       Unchanged appearance of focal signal abnormality involving the thoracic cord at the level of T4-5 which may represent chronic demyelinating plaque in this patient with known history of multiple sclerosis.  Other previously described smaller lesions involving the more distal cord are not definitively  visualized and may have been artifactual in nature.  No new lesions or evidence of active demyelination.    1 cm cystic structure at the mid to upper pole of the right kidney which appears to have increased somewhat in size from prior.  Correlation with renal ultrasound recommended.      Electronically signed by: Agus Morales MD  Date: 05/29/2018  Time: 15:42             Last Resulted: 05/29/18 15:42 Order Details View Encounter Lab and Collection Details Routing Result History            External Result Report     External Result Report   Narrative     EXAMINATION:  MRI THORACIC SPINE DEMYELINATING W W/O CONTRAST    CLINICAL HISTORY:  Multiple Sclerosis; back pain; muscle spasm;  Multiple sclerosis    TECHNIQUE:  Multiplanar/multisequence MRI of the thoracic spine was obtained prior to and following administration of 8.5IV Gadavist.    COMPARISON:  01/14/2016    FINDINGS:  Vertebral body heights and alignment are within normal limits.  The intervertebral disc spaces are preserved.  Marrow signal is within normal limits with no evidence of fracture or marrow replacement process.    Focal signal abnormality again noted centrally within the thoracic cord at the level of T4-5 which allowing for some technical differences is unchanged from prior.  Other previously described smaller foci of signal abnormality more distally in the thoracic cord are not definitively visualized and may have been artifactual in nature.  No new lesions demonstrated.  No abnormal enhancement to suggest active demyelination.    Paraspinous soft tissues are within normal limits.    Limited evaluation of the intrathoracic and upper abdominal structures demonstrates a cyst at the mid to upper pole in the right kidney which appears to have slightly increased in size measuring approximately 1 cm.    There is no abnormal enhancement.   Impression       Unchanged appearance of focal signal abnormality involving the thoracic cord at the level of  T4-5 which may represent chronic demyelinating plaque in this patient with known history of multiple sclerosis.  Other previously described smaller lesions involving the more distal cord are not definitively visualized and may have been artifactual in nature.  No new lesions or evidence of active demyelination.    1 cm cystic structure at the mid to upper pole of the right kidney which appears to have increased somewhat in size from prior.  Correlation with renal ultrasound recommended.      Electronically signed by: Agus Morales MD  Date: 05/29/2018  Time: 15:42        Images reviewed with the patient.   Renal ultrasound showed simple cyst with no further follow-up required.     Labs:     Lab Results   Component Value Date    WCUNQEPH82BF 86 05/21/2018    TZPBYKTE31MT 130 (H) 09/01/2017    CUJXBMOQ68MR 29 (L) 08/01/2016     Lab Results   Component Value Date    ZX8MPJEC 69.0 05/21/2018    ABSOLUTECD3 1776 05/21/2018    NP5FEIXS 14.7 05/21/2018    ABSOLUTECD8 378 05/21/2018    JC2JSLVI 52.7 05/21/2018    ABSOLUTECD4 1356 05/21/2018    LABCD48 3.59 05/21/2018    LABCD48 3.57 12/30/2016    LABCD48 3.57 12/30/2016     Lab Results   Component Value Date    WBC 8.73 05/21/2018    RBC 4.09 05/21/2018    HGB 11.0 (L) 05/21/2018    HCT 36.6 (L) 05/21/2018    MCV 90 05/21/2018    MCH 26.9 (L) 05/21/2018    MCHC 30.1 (L) 05/21/2018    RDW 13.7 05/21/2018     05/21/2018    MPV 11.5 05/21/2018    GRAN 5.5 05/21/2018    GRAN 62.4 05/21/2018    LYMPH 2.4 05/21/2018    LYMPH 27.5 05/21/2018    MONO 0.7 05/21/2018    MONO 7.7 05/21/2018    EOS 0.1 05/21/2018    BASO 0.04 05/21/2018    EOSINOPHIL 1.4 05/21/2018    BASOPHIL 0.5 05/21/2018     Sodium   Date Value Ref Range Status   05/09/2014 142 136 - 145 mmol/L Final     Potassium   Date Value Ref Range Status   05/09/2014 3.6 3.5 - 5.1 mmol/L Final     Chloride   Date Value Ref Range Status   05/09/2014 107 95 - 110 mmol/L Final     CO2   Date Value Ref Range Status    05/09/2014 25 23 - 29 mmol/L Final     Glucose   Date Value Ref Range Status   05/09/2014 74 70 - 110 mg/dL Final     BUN, Bld   Date Value Ref Range Status   05/09/2014 18 6 - 20 mg/dL Final     Creatinine   Date Value Ref Range Status   05/09/2014 0.7 0.5 - 1.4 mg/dL Final     Calcium   Date Value Ref Range Status   05/09/2014 8.7 8.7 - 10.5 mg/dL Final     Total Protein   Date Value Ref Range Status   05/21/2018 7.4 6.0 - 8.4 g/dL Final     Albumin   Date Value Ref Range Status   05/21/2018 3.9 3.5 - 5.2 g/dL Final     Total Bilirubin   Date Value Ref Range Status   05/21/2018 0.2 0.1 - 1.0 mg/dL Final     Comment:     For infants and newborns, interpretation of results should be based  on gestational age, weight and in agreement with clinical  observations.  Premature Infant recommended reference ranges:  Up to 24 hours.............<8.0 mg/dL  Up to 48 hours............<12.0 mg/dL  3-5 days..................<15.0 mg/dL  6-29 days.................<15.0 mg/dL       Alkaline Phosphatase   Date Value Ref Range Status   05/21/2018 75 55 - 135 U/L Final     AST   Date Value Ref Range Status   05/21/2018 16 10 - 40 U/L Final     ALT   Date Value Ref Range Status   05/21/2018 13 10 - 44 U/L Final     Anion Gap   Date Value Ref Range Status   05/09/2014 10 8 - 16 mmol/L Final     eGFR if    Date Value Ref Range Status   05/09/2014 >60.0 >60 mL/min/1.73 m^2 Final     eGFR if non    Date Value Ref Range Status   05/09/2014 >60.0 >60 mL/min/1.73 m^2 Final     Comment:     Calculation used to obtain the estimated glomerular filtration  rate (eGFR) is the CKD-EPI equation. Since race is unknown   in our information system, the eGFR values for   -American and Non--American patients are given   for each creatinine result.     Diagnosis/Assessment/Plan:    1. Multiple Sclerosis  · Assessment:  Lashell is clinically stable on Tecfidera.  · Imaging: MRI brain in December (ordered  today) without contrast   · Disease Modifying Therapies:  Continue Tecfidera and Vitamin D. Will check CBC/CD8 to screen for lymphopenia. We will also check LFT and vitamin D today.   2. MS Symptom Assessment / Management  · Sleep Disturbance: She is going to discuss plan for sleep medication with pain management, who prescribes her Ambien. She may consider taking a break from Ambien or cutting back to see if this reduces her tolerance to it. We may consider a sleep study in the future.   · Spasticity: Continue tizanidine.   · Pain: Continue follow up with pain management.     Over 50% of this 40 minute visit was spent in direct face to face counseling of the patient about MS, DMT considerations, and MS symptom management. The patient agrees with the plan of care. She will follow up with Dr. Whiting in January.     Natalie Guaman, [facility] HospitalNS-BC, MSCN    There are no diagnoses linked to this encounter.

## 2018-09-24 LAB
ABSOLUTE CD3: 1589 CELLS/UL (ref 700–2100)
ABSOLUTE CD8: 340 CELLS/UL (ref 200–900)
CD3%: 74.9 % (ref 55–83)
CD3+CD4+ CELLS # BLD: 1240 CELLS/UL (ref 300–1400)
CD3+CD4+ CELLS NFR BLD: 58.5 % (ref 28–57)
CD4/CD8 RATIO: 3.64 (ref 0.9–3.6)
CD8 % SUPPRESSOR T CELL: 16 % (ref 10–39)

## 2018-09-26 ENCOUNTER — PATIENT MESSAGE (OUTPATIENT)
Dept: NEUROLOGY | Facility: CLINIC | Age: 43
End: 2018-09-26

## 2018-10-05 ENCOUNTER — PATIENT MESSAGE (OUTPATIENT)
Dept: NEUROLOGY | Facility: CLINIC | Age: 43
End: 2018-10-05

## 2018-11-21 DIAGNOSIS — M79.2 NEUROPATHIC PAIN: ICD-10-CM

## 2018-11-21 RX ORDER — PREGABALIN 100 MG/1
CAPSULE ORAL
Qty: 150 CAPSULE | Refills: 3 | Status: SHIPPED | OUTPATIENT
Start: 2018-11-21 | End: 2019-06-06 | Stop reason: SDUPTHER

## 2018-12-10 ENCOUNTER — TELEPHONE (OUTPATIENT)
Dept: RADIOLOGY | Facility: HOSPITAL | Age: 43
End: 2018-12-10

## 2018-12-11 ENCOUNTER — HOSPITAL ENCOUNTER (OUTPATIENT)
Dept: RADIOLOGY | Facility: HOSPITAL | Age: 43
Discharge: HOME | End: 2018-12-11
Attending: CLINICAL NURSE SPECIALIST
Payer: COMMERCIAL

## 2018-12-11 DIAGNOSIS — G35 MULTIPLE SCLEROSIS: ICD-10-CM

## 2018-12-11 PROCEDURE — 25500020 PHARM REV CODE 255: Mod: PO | Performed by: CLINICAL NURSE SPECIALIST

## 2018-12-11 PROCEDURE — 70553 MRI BRAIN STEM W/O & W/DYE: CPT | Mod: TC,PO

## 2018-12-11 PROCEDURE — 70553 MRI BRAIN STEM W/O & W/DYE: CPT | Mod: 26,,, | Performed by: RADIOLOGY

## 2018-12-11 PROCEDURE — A9585 GADOBUTROL INJECTION: HCPCS | Mod: PO | Performed by: CLINICAL NURSE SPECIALIST

## 2018-12-11 RX ORDER — GADOBUTROL 604.72 MG/ML
8.5 INJECTION INTRAVENOUS
Status: COMPLETED | OUTPATIENT
Start: 2018-12-11 | End: 2018-12-11

## 2018-12-11 RX ADMIN — GADOBUTROL 8.5 ML: 604.72 INJECTION INTRAVENOUS at 01:12

## 2019-01-22 DIAGNOSIS — G35 MULTIPLE SCLEROSIS: ICD-10-CM

## 2019-01-22 RX ORDER — DIMETHYL FUMARATE 240 MG/1
CAPSULE ORAL
Qty: 60 CAPSULE | Refills: 1 | Status: SHIPPED | OUTPATIENT
Start: 2019-01-22 | End: 2019-03-25 | Stop reason: SDUPTHER

## 2019-01-23 ENCOUNTER — OFFICE VISIT (OUTPATIENT)
Dept: NEUROLOGY | Facility: CLINIC | Age: 44
End: 2019-01-23
Payer: COMMERCIAL

## 2019-01-23 VITALS
HEART RATE: 85 BPM | SYSTOLIC BLOOD PRESSURE: 130 MMHG | BODY MASS INDEX: 30.01 KG/M2 | HEIGHT: 68 IN | DIASTOLIC BLOOD PRESSURE: 90 MMHG | WEIGHT: 198 LBS

## 2019-01-23 DIAGNOSIS — Z79.899 HIGH RISK MEDICATION USE: ICD-10-CM

## 2019-01-23 DIAGNOSIS — M62.838 MUSCLE SPASM: ICD-10-CM

## 2019-01-23 DIAGNOSIS — Z71.89 COUNSELING REGARDING GOALS OF CARE: ICD-10-CM

## 2019-01-23 DIAGNOSIS — Z29.89 PROPHYLACTIC IMMUNOTHERAPY: ICD-10-CM

## 2019-01-23 DIAGNOSIS — G35 MULTIPLE SCLEROSIS: Primary | ICD-10-CM

## 2019-01-23 PROCEDURE — 99215 OFFICE O/P EST HI 40 MIN: CPT | Mod: S$GLB,,, | Performed by: CLINICAL NURSE SPECIALIST

## 2019-01-23 PROCEDURE — 99215 PR OFFICE/OUTPT VISIT, EST, LEVL V, 40-54 MIN: ICD-10-PCS | Mod: S$GLB,,, | Performed by: CLINICAL NURSE SPECIALIST

## 2019-01-23 PROCEDURE — 99999 PR PBB SHADOW E&M-EST. PATIENT-LVL III: CPT | Mod: PBBFAC,,, | Performed by: CLINICAL NURSE SPECIALIST

## 2019-01-23 PROCEDURE — 3008F BODY MASS INDEX DOCD: CPT | Mod: CPTII,S$GLB,, | Performed by: CLINICAL NURSE SPECIALIST

## 2019-01-23 PROCEDURE — 3008F PR BODY MASS INDEX (BMI) DOCUMENTED: ICD-10-PCS | Mod: CPTII,S$GLB,, | Performed by: CLINICAL NURSE SPECIALIST

## 2019-01-23 PROCEDURE — 99999 PR PBB SHADOW E&M-EST. PATIENT-LVL III: ICD-10-PCS | Mod: PBBFAC,,, | Performed by: CLINICAL NURSE SPECIALIST

## 2019-01-23 NOTE — PROGRESS NOTES
"Subjective:       Patient ID: Lashell Gonzalez is a 43 y.o. female who presents today for a routine clinic visit for MS.  The history has been provided by the patient. She was last seen in September 2018.     MS HPI:  · DMT: Tecfidera   · Side effects from DMT? Yes - She still has some minor flushing. She was off of medication for about a week in November because of delivery issues with her pharmacy.   · Taking vitamin D3 as recommended? Yes -  Dose: 1000 units daily  · She has had one fall since the last visit. She is not sure if this is MS related. She slipped in a puddle and "fell pretty good." She denies hitting her head.    · She did a lot of hiking last week. She tries to walk as much as possible.   · She attends a monthly MS support group.     SOCIAL HISTORY  Social History     Tobacco Use    Smoking status: Never Smoker   Substance Use Topics    Alcohol use: No    Drug use: Not on file     Living arrangements - the patient lives with her family.  Employment: not currently working     MS ROS:  · Fatigue: Yes --Energy level has been "on the low side." She has been iron deficient in the past.   · Sleep Disturbance: Yes -- Has trouble getting to sleep at an early time. Ambien helps occasionally.   · Bladder Dysfunction: No. She denies any recent UTIs. She has some occasional hesitancy, but this has gone on since she was a child.   · Bowel Dysfunction: Yes - She has history of gastric ulcers.   · Spasticity: Yes - Tightness in upper back and shoulders, spasms in hip. She did have a long car ride recently.  She takes tizanidine 4-6mg at night.   · Visual Symptoms: Yes -No change.  She feels like her right eye is getting weaker. She has an eye doctor and saw him recently. She has reading glasses now.   · Cognitive: Yes - Memory is "normal for my age."   · Mood Disorder: Yes - She denies depression. When she is tired, she is more irritable. She is helping to take care of her mom right now, which is creating " "some stress.   · Gait Disturbance: Yes -She states that her balance is pretty good.   · Falls: No  · Hand Dysfunction: Her "knuckles feel sore" in the morning.   · Pain: Yes - As above; She takes Hydrocodone, Cymbalta, and Lyrica, and tizanidine at night. Migraines have been better. She follows with pain management.   · Sexual Dysfunction: No; her drive is a bit low   · Skin Breakdown: No  · Tremors: No  · Dysphagia:  No  · Dysarthria:  No  · Heat sensitivity: She has been avoiding the heat as much as possible. The heat drains her. Cold does not affect her.   · Any un-met adaptive needs? No  · Copay Assist?  Yes - $0 copay for Tecfidera           Objective:        1. 25 foot timed walk: 4.0 seconds today without assist; was 4.2 seconds at last visit without assist   Neurologic Exam     Mental Status   Oriented to person, place, and time.   Attention: normal. Concentration: normal.   Speech: speech is normal   Level of consciousness: alert  Knowledge: good.   Normal comprehension.     Cranial Nerves     CN III, IV, VI   Pupils are equal, round, and reactive to light.  Right pupil: Shape: regular.   Left pupil: Shape: regular.   Nystagmus: none     CN V   Right facial sensation deficit: none  Left facial sensation deficit: none    CN VII   Right facial weakness: none  Left facial weakness: none    CN VIII   Hearing: intact    CN IX, X   Palate: symmetric    CN XI   Right sternocleidomastoid strength: normal  Left sternocleidomastoid strength: normal  Right trapezius strength: normal  Left trapezius strength: normal    CN XII   Tongue deviation: none    She has a significant strabismus but each eye tested a full range of motion independently;      Motor Exam   Muscle bulk: normal  Overall muscle tone: normal    Strength   Right neck flexion: 5/5  Left neck flexion: 5/5  Right neck extension: 5/5  Left neck extension: 5/5  Right deltoid: 5/5  Left deltoid: 5/5  Right biceps: 5/5  Left biceps: 5/5  Right triceps: " 5/5  Left triceps: 5/5  Right wrist flexion: 5/5  Left wrist flexion: 5/5  Right wrist extension: 5/5  Left wrist extension: 5/5  Right interossei: 5/5  Left interossei: 5/5  Right iliopsoas: 5/5  Left iliopsoas: 5/5  Right quadriceps: 5/5  Left quadriceps: 5/5  Right hamstrin/5  Left hamstrin/5  Right anterior tibial: 5/5  Left anterior tibial: 5/5  Right gastroc: 5/5  Left gastroc: 5/5    Sensory Exam   Right arm vibration: normal  Left arm vibration: normal  Right leg vibration: decreased from knee  Left leg vibration: decreased from knee    Mild decrease in vibratory sense to feet.        Gait, Coordination, and Reflexes     Gait  Gait: normal (stable)    Coordination   Romberg: negative  Finger to nose coordination: normal  Heel to shin coordination: normal  Tandem walking coordination: normal    Tremor   Resting tremor: absent    Reflexes   Right brachioradialis: 2+  Left brachioradialis: 2+  Right biceps: 2+  Left biceps: 2+  Right triceps: 2+  Left triceps: 2+  Right patellar: 3+  Left patellar: 3+  Right achilles: 2+  Left achilles: 2+  Right plantar: normal  Left plantar: normal    She is able to walk on toes and heels. She can hop on each foot ten times.       She can hop on each foot ten times.   Imaging:     Labs:     Lab Results   Component Value Date    QIHFZTKT58VZ 50 2018    DOJVYBFU41AN 86 2018    QIABDFZO14FH 130 (H) 2017       Lab Results   Component Value Date    YP0PQDXX 74.9 2018    ABSOLUTECD3 1589 2018    MJ8AFHHC 16.0 2018    ABSOLUTECD8 340 2018    UF0VBUSZ 58.5 (H) 2018    ABSOLUTECD4 1240 2018    LABCD48 3.64 (H) 2018    LABCD48 3.59 2018    LABCD48 3.57 2016    LABCD48 3.57 2016     Lab Results   Component Value Date    WBC 8.09 2018    RBC 4.36 2018    HGB 11.6 (L) 2018    HCT 37.3 2018    MCV 86 2018    MCH 26.6 (L) 2018    MCHC 31.1 (L) 2018    RDW 14.6 (H)  09/21/2018     09/21/2018    MPV 11.6 09/21/2018    GRAN 4.9 09/21/2018    GRAN 60.8 09/21/2018    LYMPH 2.3 09/21/2018    LYMPH 28.9 09/21/2018    MONO 0.7 09/21/2018    MONO 8.0 09/21/2018    EOS 0.1 09/21/2018    BASO 0.05 09/21/2018    EOSINOPHIL 1.5 09/21/2018    BASOPHIL 0.6 09/21/2018     Sodium   Date Value Ref Range Status   05/09/2014 142 136 - 145 mmol/L Final     Potassium   Date Value Ref Range Status   05/09/2014 3.6 3.5 - 5.1 mmol/L Final     Chloride   Date Value Ref Range Status   05/09/2014 107 95 - 110 mmol/L Final     CO2   Date Value Ref Range Status   05/09/2014 25 23 - 29 mmol/L Final     Glucose   Date Value Ref Range Status   05/09/2014 74 70 - 110 mg/dL Final     BUN, Bld   Date Value Ref Range Status   05/09/2014 18 6 - 20 mg/dL Final     Creatinine   Date Value Ref Range Status   05/09/2014 0.7 0.5 - 1.4 mg/dL Final     Calcium   Date Value Ref Range Status   05/09/2014 8.7 8.7 - 10.5 mg/dL Final     Total Protein   Date Value Ref Range Status   09/21/2018 7.5 6.0 - 8.4 g/dL Final     Albumin   Date Value Ref Range Status   09/21/2018 3.9 3.5 - 5.2 g/dL Final     Total Bilirubin   Date Value Ref Range Status   09/21/2018 0.1 0.1 - 1.0 mg/dL Final     Comment:     For infants and newborns, interpretation of results should be based  on gestational age, weight and in agreement with clinical  observations.  Premature Infant recommended reference ranges:  Up to 24 hours.............<8.0 mg/dL  Up to 48 hours............<12.0 mg/dL  3-5 days..................<15.0 mg/dL  6-29 days.................<15.0 mg/dL       Alkaline Phosphatase   Date Value Ref Range Status   09/21/2018 70 55 - 135 U/L Final     AST   Date Value Ref Range Status   09/21/2018 17 10 - 40 U/L Final     ALT   Date Value Ref Range Status   09/21/2018 15 10 - 44 U/L Final     Anion Gap   Date Value Ref Range Status   05/09/2014 10 8 - 16 mmol/L Final     eGFR if    Date Value Ref Range Status    05/09/2014 >60.0 >60 mL/min/1.73 m^2 Final     eGFR if non    Date Value Ref Range Status   05/09/2014 >60.0 >60 mL/min/1.73 m^2 Final     Comment:     Calculation used to obtain the estimated glomerular filtration  rate (eGFR) is the CKD-EPI equation. Since race is unknown   in our information system, the eGFR values for   -American and Non--American patients are given   for each creatinine result.     Diagnosis/Assessment/Plan:    1. Multiple Sclerosis  · Assessment: Lashell is clinically and radiologically stable on Tecfidera.   · Imaging: MRI brain December 2019 without contrast  · Disease Modifying Therapies: Continue Tecfidera and Vitamin D. Will check CBC, CD8, and LFTs as part of Tecfidera safety labs. Will check Vitamin D at next visit.     2. MS Symptom Assessment / Management  · Fatigue: Will check iron level.   · Spasticity: Continue Tizanidine.    · Pain: Continue follow-up with pain management.     Over 50% of this 40 minute visit was spent in direct face to face counseling of the patient about MS, DMT considerations, and MS symptom management. The patient agrees with the plan of care. She will follow up with Dr. Whiting in 4 months.     Natalie Guaman, [facility], MSCN      There are no diagnoses linked to this encounter.

## 2019-02-26 ENCOUNTER — OFFICE VISIT (OUTPATIENT)
Dept: PAIN MEDICINE | Facility: CLINIC | Age: 44
End: 2019-02-26
Attending: ANESTHESIOLOGY
Payer: COMMERCIAL

## 2019-02-26 VITALS
SYSTOLIC BLOOD PRESSURE: 138 MMHG | TEMPERATURE: 99 F | WEIGHT: 196.19 LBS | HEIGHT: 68 IN | BODY MASS INDEX: 29.73 KG/M2 | DIASTOLIC BLOOD PRESSURE: 91 MMHG | HEART RATE: 91 BPM

## 2019-02-26 DIAGNOSIS — M47.9 OSTEOARTHRITIS OF SPINE, UNSPECIFIED SPINAL OSTEOARTHRITIS COMPLICATION STATUS, UNSPECIFIED SPINAL REGION: ICD-10-CM

## 2019-02-26 DIAGNOSIS — M47.819 SPONDYLOSIS WITHOUT MYELOPATHY: ICD-10-CM

## 2019-02-26 DIAGNOSIS — M50.30 DDD (DEGENERATIVE DISC DISEASE), CERVICAL: Primary | ICD-10-CM

## 2019-02-26 PROCEDURE — 3008F BODY MASS INDEX DOCD: CPT | Mod: CPTII,S$GLB,, | Performed by: ANESTHESIOLOGY

## 2019-02-26 PROCEDURE — 99204 OFFICE O/P NEW MOD 45 MIN: CPT | Mod: S$GLB,,, | Performed by: ANESTHESIOLOGY

## 2019-02-26 PROCEDURE — 99999 PR PBB SHADOW E&M-EST. PATIENT-LVL IV: ICD-10-PCS | Mod: PBBFAC,,, | Performed by: ANESTHESIOLOGY

## 2019-02-26 PROCEDURE — 3008F PR BODY MASS INDEX (BMI) DOCUMENTED: ICD-10-PCS | Mod: CPTII,S$GLB,, | Performed by: ANESTHESIOLOGY

## 2019-02-26 PROCEDURE — 99999 PR PBB SHADOW E&M-EST. PATIENT-LVL IV: CPT | Mod: PBBFAC,,, | Performed by: ANESTHESIOLOGY

## 2019-02-26 PROCEDURE — 99204 PR OFFICE/OUTPT VISIT, NEW, LEVL IV, 45-59 MIN: ICD-10-PCS | Mod: S$GLB,,, | Performed by: ANESTHESIOLOGY

## 2019-02-26 RX ORDER — FERROUS SULFATE 325(65) MG
TABLET ORAL
COMMUNITY
Start: 2019-01-01

## 2019-02-26 RX ORDER — SUMATRIPTAN SUCCINATE 100 MG/1
100 TABLET ORAL ONCE
COMMUNITY
Start: 2018-01-01

## 2019-02-26 RX ORDER — DICLOFENAC EPOLAMINE 0.01 G/1
1 SYSTEM TOPICAL EVERY 12 HOURS
Refills: 5 | COMMUNITY
Start: 2018-12-05

## 2019-02-26 RX ORDER — PANTOPRAZOLE SODIUM 40 MG/1
TABLET, DELAYED RELEASE ORAL
Refills: 0 | COMMUNITY
Start: 2019-02-06 | End: 2020-06-30

## 2019-02-26 RX ORDER — ACETAMINOPHEN, DIPHENHYDRAMINE HCL, PHENYLEPHRINE HCL 325; 25; 5 MG/1; MG/1; MG/1
TABLET ORAL
COMMUNITY
Start: 2018-08-01
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
## 2019-02-28 ENCOUNTER — PATIENT MESSAGE (OUTPATIENT)
Dept: NEUROLOGY | Facility: CLINIC | Age: 44
End: 2019-02-28

## 2019-03-01 ENCOUNTER — DOCUMENTATION ONLY (OUTPATIENT)
Dept: NEUROLOGY | Facility: CLINIC | Age: 44
End: 2019-03-01

## 2019-03-17 ENCOUNTER — PATIENT MESSAGE (OUTPATIENT)
Dept: PAIN MEDICINE | Facility: CLINIC | Age: 44
End: 2019-03-17

## 2019-03-25 DIAGNOSIS — G35 MULTIPLE SCLEROSIS: ICD-10-CM

## 2019-03-26 RX ORDER — DIMETHYL FUMARATE 240 MG/1
CAPSULE ORAL
Qty: 60 CAPSULE | Refills: 1 | Status: SHIPPED | OUTPATIENT
Start: 2019-03-26 | End: 2019-05-21 | Stop reason: SDUPTHER

## 2019-05-05 ENCOUNTER — PATIENT MESSAGE (OUTPATIENT)
Dept: NEUROLOGY | Facility: CLINIC | Age: 44
End: 2019-05-05

## 2019-05-13 ENCOUNTER — LAB VISIT (OUTPATIENT)
Dept: LAB | Facility: HOSPITAL | Age: 44
End: 2019-05-13
Payer: COMMERCIAL

## 2019-05-13 ENCOUNTER — OFFICE VISIT (OUTPATIENT)
Dept: NEUROLOGY | Facility: CLINIC | Age: 44
End: 2019-05-13
Payer: COMMERCIAL

## 2019-05-13 VITALS
DIASTOLIC BLOOD PRESSURE: 90 MMHG | SYSTOLIC BLOOD PRESSURE: 128 MMHG | HEIGHT: 68 IN | HEART RATE: 97 BPM | WEIGHT: 196.44 LBS | BODY MASS INDEX: 29.77 KG/M2

## 2019-05-13 DIAGNOSIS — Z79.899 HIGH RISK MEDICATION USE: ICD-10-CM

## 2019-05-13 DIAGNOSIS — G89.29 OTHER CHRONIC PAIN: Primary | ICD-10-CM

## 2019-05-13 DIAGNOSIS — Z29.89 PROPHYLACTIC IMMUNOTHERAPY: ICD-10-CM

## 2019-05-13 DIAGNOSIS — G35 MULTIPLE SCLEROSIS: ICD-10-CM

## 2019-05-13 DIAGNOSIS — Z71.89 COUNSELING REGARDING GOALS OF CARE: ICD-10-CM

## 2019-05-13 LAB
25(OH)D3+25(OH)D2 SERPL-MCNC: 83 NG/ML (ref 30–96)
ALBUMIN SERPL BCP-MCNC: 4.3 G/DL (ref 3.5–5.2)
ALP SERPL-CCNC: 75 U/L (ref 55–135)
ALT SERPL W/O P-5'-P-CCNC: 29 U/L (ref 10–44)
AST SERPL-CCNC: 28 U/L (ref 10–40)
BASOPHILS # BLD AUTO: 0.05 K/UL (ref 0–0.2)
BASOPHILS NFR BLD: 0.5 % (ref 0–1.9)
BILIRUB DIRECT SERPL-MCNC: 0.1 MG/DL (ref 0.1–0.3)
BILIRUB SERPL-MCNC: 0.3 MG/DL (ref 0.1–1)
DIFFERENTIAL METHOD: NORMAL
EOSINOPHIL # BLD AUTO: 0.1 K/UL (ref 0–0.5)
EOSINOPHIL NFR BLD: 1 % (ref 0–8)
ERYTHROCYTE [DISTWIDTH] IN BLOOD BY AUTOMATED COUNT: 12.6 % (ref 11.5–14.5)
HCT VFR BLD AUTO: 41 % (ref 37–48.5)
HGB BLD-MCNC: 13.1 G/DL (ref 12–16)
IMM GRANULOCYTES # BLD AUTO: 0.03 K/UL (ref 0–0.04)
IMM GRANULOCYTES NFR BLD AUTO: 0.3 % (ref 0–0.5)
LYMPHOCYTES # BLD AUTO: 2.1 K/UL (ref 1–4.8)
LYMPHOCYTES NFR BLD: 22.4 % (ref 18–48)
MCH RBC QN AUTO: 29.5 PG (ref 27–31)
MCHC RBC AUTO-ENTMCNC: 32 G/DL (ref 32–36)
MCV RBC AUTO: 92 FL (ref 82–98)
MONOCYTES # BLD AUTO: 0.6 K/UL (ref 0.3–1)
MONOCYTES NFR BLD: 6 % (ref 4–15)
NEUTROPHILS # BLD AUTO: 6.4 K/UL (ref 1.8–7.7)
NEUTROPHILS NFR BLD: 69.8 % (ref 38–73)
NRBC BLD-RTO: 0 /100 WBC
PLATELET # BLD AUTO: 281 K/UL (ref 150–350)
PMV BLD AUTO: 10.8 FL (ref 9.2–12.9)
PROT SERPL-MCNC: 7.6 G/DL (ref 6–8.4)
RBC # BLD AUTO: 4.44 M/UL (ref 4–5.4)
WBC # BLD AUTO: 9.14 K/UL (ref 3.9–12.7)

## 2019-05-13 PROCEDURE — 85025 COMPLETE CBC W/AUTO DIFF WBC: CPT

## 2019-05-13 PROCEDURE — 36415 COLL VENOUS BLD VENIPUNCTURE: CPT

## 2019-05-13 PROCEDURE — 80076 HEPATIC FUNCTION PANEL: CPT

## 2019-05-13 PROCEDURE — 3008F PR BODY MASS INDEX (BMI) DOCUMENTED: ICD-10-PCS | Mod: CPTII,S$GLB,, | Performed by: CLINICAL NURSE SPECIALIST

## 2019-05-13 PROCEDURE — 99999 PR PBB SHADOW E&M-EST. PATIENT-LVL IV: ICD-10-PCS | Mod: PBBFAC,,, | Performed by: CLINICAL NURSE SPECIALIST

## 2019-05-13 PROCEDURE — 99999 PR PBB SHADOW E&M-EST. PATIENT-LVL IV: CPT | Mod: PBBFAC,,, | Performed by: CLINICAL NURSE SPECIALIST

## 2019-05-13 PROCEDURE — 86359 T CELLS TOTAL COUNT: CPT

## 2019-05-13 PROCEDURE — 82306 VITAMIN D 25 HYDROXY: CPT

## 2019-05-13 PROCEDURE — 99215 OFFICE O/P EST HI 40 MIN: CPT | Mod: S$GLB,,, | Performed by: CLINICAL NURSE SPECIALIST

## 2019-05-13 PROCEDURE — 86360 T CELL ABSOLUTE COUNT/RATIO: CPT

## 2019-05-13 PROCEDURE — 99215 PR OFFICE/OUTPT VISIT, EST, LEVL V, 40-54 MIN: ICD-10-PCS | Mod: S$GLB,,, | Performed by: CLINICAL NURSE SPECIALIST

## 2019-05-13 PROCEDURE — 3008F BODY MASS INDEX DOCD: CPT | Mod: CPTII,S$GLB,, | Performed by: CLINICAL NURSE SPECIALIST

## 2019-05-13 NOTE — PROGRESS NOTES
Subjective:       Patient ID: Lashell Gonzalez is a 44 y.o. female who presents today for a fit-in clinic visit for MS.  The history has been provided by the patient. She was last seen by me in January 2019.      MS HPI:  · DMT: Tecfidera   · Side effects from DMT? No  · Taking vitamin D3 as recommended? Yes -  Dose: 1000 units daily  · She is reporting generalized pain in left hip, both shoulders (with tenderness near the scapula), and neck/upper back pain. She wonders if the left hip pain is bursitis. This has been going on for over a year. If she lies on her left side, she has pain. She tosses and turns throughout the night. She is not sure if what she is experiencing is nerve pain or muscle pain. She states that this pain is not flaring, but is gradually getting worse.  · She has extreme fatigue, and she is having trouble sleeping.   · She has seen her pain management doctor. She continues to take Hydrocodone, Cymbalta, Lyrica, and tizanidine at night. She was out of the medication this weekend, and she managed without it, but her pain was severe. The pain management doctor told her that he thinks arthritis may be playing a role. She has not been officially diagnosed with fibromyalgia. She saw a rheumatologist a few years ago, but he has since moved. She is seeking a referral to a new rheumatologist.   · She is getting trigger point injections, but these are not as helpful as they used to be.   · She denies any significant lower back pain.   · She is getting occasional muscle spasms in her left hip, as well as her shoulders. Tizanidine 6mg helps, but gives her dry mouth.   · The pain is making her feel depressed. She has been going out less, in general, because of the pain.   · She has not been moving around as much because she is feeling so poorly.   · Music did help her today as a distraction technique.   · She has tried topical pain cream, which helps a bit.   · She has seen Dr. Gloria here at [facility].  She had an epidural a few years ago, and she does not remember this being very helpful.       SOCIAL HISTORY  Social History     Tobacco Use    Smoking status: Never Smoker    Smokeless tobacco: Never Used   Substance Use Topics    Alcohol use: No    Drug use: Never     Living arrangements - the patient lives with her family.  Employment--not currently working           Objective:        1. 25 foot timed walk: was 4.0 seconds at last visit without assist   Neurologic Exam        Neuro exam deferred today.   Imaging:     No new imaging to review today.   Labs:     Lab Results   Component Value Date    SXPRFLCZ96BX 50 09/21/2018    LHABUODQ29EP 86 05/21/2018    TCBGZMAE11EM 130 (H) 09/01/2017     Lab Results   Component Value Date    QC9RPOZD 71.7 01/23/2019    ABSOLUTECD3 1646 01/23/2019    OA4PGQCI 14.3 01/23/2019    ABSOLUTECD8 327 01/23/2019    AK8YQGAI 56.3 01/23/2019    ABSOLUTECD4 1293 01/23/2019    LABCD48 3.95 (H) 01/23/2019    LABCD48 3.64 (H) 09/21/2018    LABCD48 3.59 05/21/2018     Lab Results   Component Value Date    WBC 6.77 01/23/2019    RBC 4.38 01/23/2019    HGB 12.0 01/23/2019    HCT 39.5 01/23/2019    MCV 90 01/23/2019    MCH 27.4 01/23/2019    MCHC 30.4 (L) 01/23/2019    RDW 13.6 01/23/2019     01/23/2019    MPV 11.0 01/23/2019    GRAN 3.9 01/23/2019    GRAN 57.1 01/23/2019    LYMPH 2.2 01/23/2019    LYMPH 32.2 01/23/2019    MONO 0.5 01/23/2019    MONO 7.1 01/23/2019    EOS 0.2 01/23/2019    BASO 0.05 01/23/2019    EOSINOPHIL 2.8 01/23/2019    BASOPHIL 0.7 01/23/2019     Sodium   Date Value Ref Range Status   05/09/2014 142 136 - 145 mmol/L Final     Potassium   Date Value Ref Range Status   05/09/2014 3.6 3.5 - 5.1 mmol/L Final     Chloride   Date Value Ref Range Status   05/09/2014 107 95 - 110 mmol/L Final     CO2   Date Value Ref Range Status   05/09/2014 25 23 - 29 mmol/L Final     Glucose   Date Value Ref Range Status   05/09/2014 74 70 - 110 mg/dL Final     BUN, Bld   Date Value  Ref Range Status   05/09/2014 18 6 - 20 mg/dL Final     Creatinine   Date Value Ref Range Status   05/09/2014 0.7 0.5 - 1.4 mg/dL Final     Calcium   Date Value Ref Range Status   05/09/2014 8.7 8.7 - 10.5 mg/dL Final     Total Protein   Date Value Ref Range Status   01/23/2019 7.0 6.0 - 8.4 g/dL Final     Albumin   Date Value Ref Range Status   01/23/2019 3.5 3.5 - 5.2 g/dL Final     Total Bilirubin   Date Value Ref Range Status   01/23/2019 0.2 0.1 - 1.0 mg/dL Final     Comment:     For infants and newborns, interpretation of results should be based  on gestational age, weight and in agreement with clinical  observations.  Premature Infant recommended reference ranges:  Up to 24 hours.............<8.0 mg/dL  Up to 48 hours............<12.0 mg/dL  3-5 days..................<15.0 mg/dL  6-29 days.................<15.0 mg/dL       Alkaline Phosphatase   Date Value Ref Range Status   01/23/2019 80 55 - 135 U/L Final     AST   Date Value Ref Range Status   01/23/2019 16 10 - 40 U/L Final     ALT   Date Value Ref Range Status   01/23/2019 14 10 - 44 U/L Final     Anion Gap   Date Value Ref Range Status   05/09/2014 10 8 - 16 mmol/L Final     eGFR if    Date Value Ref Range Status   05/09/2014 >60.0 >60 mL/min/1.73 m^2 Final     eGFR if non    Date Value Ref Range Status   05/09/2014 >60.0 >60 mL/min/1.73 m^2 Final     Comment:     Calculation used to obtain the estimated glomerular filtration  rate (eGFR) is the CKD-EPI equation. Since race is unknown   in our information system, the eGFR values for   -American and Non--American patients are given   for each creatinine result.     No results found for: COLORU, APPEARANCEUA, PHUR, SPECGRAV, PROTEINUA, GLUCUA, KETONESU, BILIRUBINUA, OCCULTUA, NITRITE, UROBILINOGEN, LEUKOCYTESUR              Diagnosis/Assessment/Plan:    1. Multiple Sclerosis  · Assessment: Lashell's MS has been stable. I do not suspect that this pain is MS  related.   · Imaging: MRI brain in December   · Disease Modifying Therapies:Continue Tecfidera and Vitamin D.  Will check CBC, CD8, LFT, andVitamin D today.     2. MS Symptom Assessment / Management   --No change to symptom management plan          3. Suspected fibromyalgia/chronic pain--Lashell and I talked at length about her pain and current management practices. She will discuss possibly adding amitriptyline or nortriptyline to her current regimen with her pain management doctor. She will also ask about CBD oil. Alternatively, she may consider seeking out a new pain management provider. I have given her a referral to rheumatology, as well, to see if she can be evaluated for fibromyalgia again and get a more definitive diagnosis. I do think she would benefit from aquatherapy, so I will send a referral to [facility]. Finally, cognitive behavioral therapy is likely a good option to help her manage her pain. She will search for providers that accept her insurance and let me know to whom she would like to be referred.       Over 50% of this 40 minute visit was spent in direct face to face counseling of the patient about her chronic pain and recommendations for alternative interventions. The patient agrees with the plan of care. She will follow up with Dr. Whiting in September.     Natalie Guaman, [facility], MSCN      There are no diagnoses linked to this encounter.

## 2019-05-13 NOTE — PATIENT INSTRUCTIONS
1.) Ask pain management doctor about adding a tricyclic antidepressant like amitriptyline or nortriptyline; ask about epidural steroid injections or medial branch block.  2.) Alternative is to seek out other pain management.  3.) Ask about CBD oil   4.) Aquatherapy  5.) Cognitive behavioral therapy--[email or web address]

## 2019-05-14 LAB
ABSOLUTE CD3: 1633 CELLS/UL (ref 700–2100)
ABSOLUTE CD8: 321 CELLS/UL (ref 200–900)
CD3%: 72.6 % (ref 55–83)
CD3+CD4+ CELLS # BLD: 1226 CELLS/UL (ref 300–1400)
CD3+CD4+ CELLS NFR BLD: 54.6 % (ref 28–57)
CD4/CD8 RATIO: 3.82 (ref 0.9–3.6)
CD8 % SUPPRESSOR T CELL: 14.3 % (ref 10–39)

## 2019-05-15 ENCOUNTER — PATIENT MESSAGE (OUTPATIENT)
Dept: NEUROLOGY | Facility: CLINIC | Age: 44
End: 2019-05-15

## 2019-05-17 ENCOUNTER — DOCUMENTATION ONLY (OUTPATIENT)
Dept: NEUROLOGY | Facility: CLINIC | Age: 44
End: 2019-05-17

## 2019-05-21 DIAGNOSIS — G35 MULTIPLE SCLEROSIS: ICD-10-CM

## 2019-05-22 RX ORDER — DIMETHYL FUMARATE 240 MG/1
CAPSULE ORAL
Qty: 180 CAPSULE | Refills: 0 | Status: SHIPPED | OUTPATIENT
Start: 2019-05-22 | End: 2019-08-28 | Stop reason: SDUPTHER

## 2019-05-29 ENCOUNTER — TELEPHONE (OUTPATIENT)
Dept: NEUROLOGY | Facility: CLINIC | Age: 44
End: 2019-05-29

## 2019-05-29 NOTE — TELEPHONE ENCOUNTER
----- Message from Maikel Burgess sent at 5/29/2019  9:12 AM CDT -----  Needs Advice    Reason for call: Pt is asking most recent Mri info be faxed to Dr. Polanco, the Rheumatologist, at         Communication Preference: [phone]    Additional Information:

## 2019-06-06 DIAGNOSIS — M79.2 NEUROPATHIC PAIN: ICD-10-CM

## 2019-06-07 RX ORDER — PREGABALIN 100 MG/1
CAPSULE ORAL
Qty: 150 CAPSULE | Refills: 5 | Status: SHIPPED | OUTPATIENT
Start: 2019-06-07 | End: 2019-12-18 | Stop reason: SDUPTHER

## 2019-08-28 DIAGNOSIS — G35 MULTIPLE SCLEROSIS: ICD-10-CM

## 2019-08-28 RX ORDER — DIMETHYL FUMARATE 240 MG/1
CAPSULE ORAL
Qty: 180 CAPSULE | Refills: 0 | Status: SHIPPED | OUTPATIENT
Start: 2019-08-28 | End: 2020-01-06 | Stop reason: SDUPTHER

## 2019-09-04 ENCOUNTER — TELEPHONE (OUTPATIENT)
Dept: OPHTHALMOLOGY | Facility: CLINIC | Age: 44
End: 2019-09-04

## 2019-09-04 ENCOUNTER — OFFICE VISIT (OUTPATIENT)
Dept: NEUROLOGY | Facility: CLINIC | Age: 44
End: 2019-09-04
Payer: COMMERCIAL

## 2019-09-04 ENCOUNTER — TELEPHONE (OUTPATIENT)
Dept: NEUROLOGY | Facility: CLINIC | Age: 44
End: 2019-09-04

## 2019-09-04 VITALS
BODY MASS INDEX: 29.55 KG/M2 | HEART RATE: 94 BPM | WEIGHT: 195 LBS | HEIGHT: 68 IN | SYSTOLIC BLOOD PRESSURE: 122 MMHG | DIASTOLIC BLOOD PRESSURE: 82 MMHG

## 2019-09-04 DIAGNOSIS — G35 MS (MULTIPLE SCLEROSIS): Primary | ICD-10-CM

## 2019-09-04 DIAGNOSIS — Z79.899 HIGH RISK MEDICATION USE: ICD-10-CM

## 2019-09-04 DIAGNOSIS — G35 MULTIPLE SCLEROSIS: ICD-10-CM

## 2019-09-04 DIAGNOSIS — Z29.89 PROPHYLACTIC IMMUNOTHERAPY: ICD-10-CM

## 2019-09-04 DIAGNOSIS — H50.9 STRABISMUS: ICD-10-CM

## 2019-09-04 DIAGNOSIS — Z71.89 COUNSELING REGARDING GOALS OF CARE: ICD-10-CM

## 2019-09-04 PROCEDURE — 3008F BODY MASS INDEX DOCD: CPT | Mod: CPTII,S$GLB,, | Performed by: PSYCHIATRY & NEUROLOGY

## 2019-09-04 PROCEDURE — 99999 PR PBB SHADOW E&M-EST. PATIENT-LVL III: CPT | Mod: PBBFAC,,, | Performed by: PSYCHIATRY & NEUROLOGY

## 2019-09-04 PROCEDURE — 99999 PR PBB SHADOW E&M-EST. PATIENT-LVL III: ICD-10-PCS | Mod: PBBFAC,,, | Performed by: PSYCHIATRY & NEUROLOGY

## 2019-09-04 PROCEDURE — 3008F PR BODY MASS INDEX (BMI) DOCUMENTED: ICD-10-PCS | Mod: CPTII,S$GLB,, | Performed by: PSYCHIATRY & NEUROLOGY

## 2019-09-04 PROCEDURE — 99215 OFFICE O/P EST HI 40 MIN: CPT | Mod: S$GLB,,, | Performed by: PSYCHIATRY & NEUROLOGY

## 2019-09-04 PROCEDURE — 99215 PR OFFICE/OUTPT VISIT, EST, LEVL V, 40-54 MIN: ICD-10-PCS | Mod: S$GLB,,, | Performed by: PSYCHIATRY & NEUROLOGY

## 2019-09-04 RX ORDER — DOXEPIN HYDROCHLORIDE 10 MG/1
10 CAPSULE ORAL NIGHTLY
Refills: 3 | COMMUNITY
Start: 2019-07-22 | End: 2020-12-02

## 2019-09-04 NOTE — PROGRESS NOTES
Subjective:       Patient ID: Lashell Gonzalez is a 44 y.o. female who presents today for a routine clinic visit for MS.      MS HPI:  · DMT: Tecfidera BID  · Side effects from DMT?   · Taking vitamin D3 as recommended? Yes -  1,000 IU/day   · She saw rheumatology in [place], and underwent extensive blood work;  Diagnosed formally with fibromyalgia; started on doxepin   · Continues to see pain management; pain is better controlled;     SOCIAL HISTORY  Social History     Tobacco Use    Smoking status: Never Smoker    Smokeless tobacco: Never Used   Substance Use Topics    Alcohol use: No    Drug use: Never     Living arrangements - the patient lives with their family.    MS REVIEW OF SYMPTOMS 5/13/2019   Do you feel abnormally tired on most days? Yes   Do you feel you generally sleep well? No-- b/c of pain   Do you have difficulty controlling your bladder?  No   Do you have difficulty controlling your bowels?  No   Do you have frequent muscle cramps, tightness or spasms in your limbs?  Yes -- on Tizanidine at night;      Do you have new visual symptoms?  No   Do you have worsening difficulty with your memory or thinking? Yes   Do you have worsening symptoms of anxiety or depression?  Yes   For patients who walk, Do you have more difficulty walking?  Yes -- feels b/c not exercising   Have you fallen since your last visit?  No   For patients who use wheelchairs: Do you have any skin wounds or breakdown? Not Applicable   Do you have difficulty using your hands?  No   Do you have shooting or burning pain? Yes   Do you have difficulty with sexual function?  Yes   If you are sexually active, are you using birth control? Y/N  N/A Not Applicable   Do you often choke when swallowing liquids or solid food?  No   Do you experience worsening symptoms when overheated? Yes   Do you need any new equipment such as a wheelchair, walker or shower chair? No   Do you receive co-pay financial assistance for your principal MS  medicine? Yes   Would you be interested in participating in an MS research trial in the future? Yes   Do you feel you have adequate family/friend support?  Yes   Do you have health insurance?   Yes   Are you currently employed? No   Do you receive SSDI/SSI?  No   Do you use marijuana or cannabis products? No   Have you been diagnosed with a urinary tract infection since your last visit here? No   Have you been diagnosed with a respiratory tract infection since your last visit here? No   Have you been to the emergency room since your last visit here? No   Have you been hospitalized since your last visit here?  No           Objective:      25 foot timed walk: 4.1s without assist  Neurologic Exam      MENTAL STATUS: intact  CRANIAL NERVE EXAM: She has a significant strabismus but each eye tested a full range of motion independently; Pupils are equal, round, and reactive to light. No facial asymmetry. Facial sensation is intact bilaterally. There is no dysarthria.    MOTOR EXAM: Normal bulk and tone throughout UE and LE bilaterally. No pronator drift; rapid sequential movements are normal; Strength is 5/5 in all groups in the lower extremities and upper extremities;   REFLEXES: 3+ and symmetric throughout in all four extremeties; toes are silent bilaterally   SENSORY EXAM: Normal to light touch, vibration, pinprick and proprioception throughout.  COORDINATION: Normal finger-to-nose exam   GAIT: Slightly wide-based and difficulty with tandem      Imaging:     Results for orders placed during the hospital encounter of 12/11/18   MRI Brain Demyelinating W W/O Contrast    Impression Findings in the cerebral white matter which are typical for multiple sclerosis.  No new or enhancing lesions to suggest active disease.      Electronically signed by: Agus Morales MD  Date:    12/12/2018  Time:    16:29     Results for orders placed during the hospital encounter of 05/29/18   MRI Cervical Spine Demyelinating W W/O Contrast     Impression Unchanged focal signal abnormality at the level of C6 in keeping with chronic demyelinating plaque in this patient with known history of multiple sclerosis.  No new lesions or evidence of active demyelination.    Previously described focal signal abnormality within the cord at the level of C3-4 is not definitively visualized and may have been artifactual in nature.    Multilevel degenerative disc disease and facet arthropathy as above with possible slight worsening spinal canal stenosis at the level of C5-6.      Electronically signed by: Agus Morales MD  Date:    05/29/2018  Time:    15:12     Results for orders placed during the hospital encounter of 05/29/18   MRI Thoracic Spine Demyelinating W W/O Contrast    Impression Unchanged appearance of focal signal abnormality involving the thoracic cord at the level of T4-5 which may represent chronic demyelinating plaque in this patient with known history of multiple sclerosis.  Other previously described smaller lesions involving the more distal cord are not definitively visualized and may have been artifactual in nature.  No new lesions or evidence of active demyelination.    1 cm cystic structure at the mid to upper pole of the right kidney which appears to have increased somewhat in size from prior.  Correlation with renal ultrasound recommended.      Electronically signed by: Agus Morales MD  Date:    05/29/2018  Time:    15:42       Labs:     Lab Results   Component Value Date    CURTSXVS97KJ 83 05/13/2019    FXPRYXMA44TZ 50 09/21/2018    ZXZSHARA73MV 86 05/21/2018     Lab Results   Component Value Date    JCVINDEX SEE COMMENT (A) 01/04/2016    JCVANTIBODY SEE COMMENT 01/04/2016     Lab Results   Component Value Date    UL1BRTLM 72.6 05/13/2019    ABSOLUTECD3 1633 05/13/2019    KH2QWPVM 14.3 05/13/2019    ABSOLUTECD8 321 05/13/2019    FY5WFDLJ 54.6 05/13/2019    ABSOLUTECD4 1226 05/13/2019    LABCD48 3.82 (H) 05/13/2019     Lab Results    Component Value Date    WBC 9.14 05/13/2019    RBC 4.44 05/13/2019    HGB 13.1 05/13/2019    HCT 41.0 05/13/2019    MCV 92 05/13/2019    MCH 29.5 05/13/2019    MCHC 32.0 05/13/2019    RDW 12.6 05/13/2019     05/13/2019    MPV 10.8 05/13/2019    GRAN 6.4 05/13/2019    GRAN 69.8 05/13/2019    LYMPH 2.1 05/13/2019    LYMPH 22.4 05/13/2019    MONO 0.6 05/13/2019    MONO 6.0 05/13/2019    EOS 0.1 05/13/2019    BASO 0.05 05/13/2019    EOSINOPHIL 1.0 05/13/2019    BASOPHIL 0.5 05/13/2019     Sodium   Date Value Ref Range Status   05/09/2014 142 136 - 145 mmol/L Final     Potassium   Date Value Ref Range Status   05/09/2014 3.6 3.5 - 5.1 mmol/L Final     Chloride   Date Value Ref Range Status   05/09/2014 107 95 - 110 mmol/L Final     CO2   Date Value Ref Range Status   05/09/2014 25 23 - 29 mmol/L Final     Glucose   Date Value Ref Range Status   05/09/2014 74 70 - 110 mg/dL Final     BUN, Bld   Date Value Ref Range Status   05/09/2014 18 6 - 20 mg/dL Final     Creatinine   Date Value Ref Range Status   05/09/2014 0.7 0.5 - 1.4 mg/dL Final     Calcium   Date Value Ref Range Status   05/09/2014 8.7 8.7 - 10.5 mg/dL Final     Total Protein   Date Value Ref Range Status   05/13/2019 7.6 6.0 - 8.4 g/dL Final     Albumin   Date Value Ref Range Status   05/13/2019 4.3 3.5 - 5.2 g/dL Final     Total Bilirubin   Date Value Ref Range Status   05/13/2019 0.3 0.1 - 1.0 mg/dL Final     Comment:     For infants and newborns, interpretation of results should be based  on gestational age, weight and in agreement with clinical  observations.  Premature Infant recommended reference ranges:  Up to 24 hours.............<8.0 mg/dL  Up to 48 hours............<12.0 mg/dL  3-5 days..................<15.0 mg/dL  6-29 days.................<15.0 mg/dL       Alkaline Phosphatase   Date Value Ref Range Status   05/13/2019 75 55 - 135 U/L Final     AST   Date Value Ref Range Status   05/13/2019 28 10 - 40 U/L Final     ALT   Date Value Ref  Range Status   05/13/2019 29 10 - 44 U/L Final     Anion Gap   Date Value Ref Range Status   05/09/2014 10 8 - 16 mmol/L Final     eGFR if    Date Value Ref Range Status   05/09/2014 >60.0 >60 mL/min/1.73 m^2 Final     eGFR if non    Date Value Ref Range Status   05/09/2014 >60.0 >60 mL/min/1.73 m^2 Final     Comment:     Calculation used to obtain the estimated glomerular filtration  rate (eGFR) is the CKD-EPI equation. Since race is unknown   in our information system, the eGFR values for   -American and Non--American patients are given   for each creatinine result.                 Diagnosis/Assessment/Plan:    1. Multiple Sclerosis  · Assessment: pt stable on Tecfidera  · Imaging: change MRI to every 2 years; has had MS 13 years, and is clinically and radiographically stable;  · Disease Modifying Therapies: continue Tecfidera; 6 mo labs planned NOvember--external labs ordered and pt handed slips.     2. MS Symptom Assessment / Management: No other changes to regimen described in ROS above    3. Fibromyalgia--she will continue to f/u with Pain management and rheumatology in [place]; regular exercise encouraged     4. Strabismus, congenital; twice repaired but worsening again--refer to Dr. Morelos    Follow up Natalie Guaman [facility]  in 6mo       Our visit today lasted 40 minutes, and 100% of this time was spent face to face with the patient. Over 50% of this visit included discussion of the treatment plan/medication changes/symptom management/exam findings/imaging results/coordination of care. The patient agrees with the plan of care.         Problem List Items Addressed This Visit        Unprioritized    MS (multiple sclerosis) - Primary    Relevant Orders    Hepatic function panel    CBC auto differential    [place]-Suppressor Ratio      Other Visit Diagnoses     Strabismus        Relevant Orders    Ambulatory Referral to Ophthalmology    Counseling regarding goals of care         Prophylactic immunotherapy        High risk medication use

## 2019-09-04 NOTE — TELEPHONE ENCOUNTER
Called patient regarding request for a Strabismus consults [facility] to schedule with

## 2019-10-22 ENCOUNTER — OFFICE VISIT (OUTPATIENT)
Dept: OPHTHALMOLOGY | Facility: CLINIC | Age: 44
End: 2019-10-22
Payer: COMMERCIAL

## 2019-10-22 ENCOUNTER — TELEPHONE (OUTPATIENT)
Dept: OPHTHALMOLOGY | Facility: CLINIC | Age: 44
End: 2019-10-22

## 2019-10-22 DIAGNOSIS — Z98.890 HISTORY OF STRABISMUS SURGERY: ICD-10-CM

## 2019-10-22 DIAGNOSIS — H50.10 EXOTROPIA: Primary | ICD-10-CM

## 2019-10-22 PROCEDURE — 92002 PR EYE EXAM, NEW PATIENT,INTERMED: ICD-10-PCS | Mod: S$GLB,,, | Performed by: OPHTHALMOLOGY

## 2019-10-22 PROCEDURE — 92002 INTRM OPH EXAM NEW PATIENT: CPT | Mod: S$GLB,,, | Performed by: OPHTHALMOLOGY

## 2019-10-22 PROCEDURE — 92060 PR SPECIAL EYE EVAL,SENSORIMOTOR: ICD-10-PCS | Mod: S$GLB,,, | Performed by: OPHTHALMOLOGY

## 2019-10-22 PROCEDURE — 99999 PR PBB SHADOW E&M-EST. PATIENT-LVL II: ICD-10-PCS | Mod: PBBFAC,,, | Performed by: OPHTHALMOLOGY

## 2019-10-22 PROCEDURE — 92060 SENSORIMOTOR EXAMINATION: CPT | Mod: S$GLB,,, | Performed by: OPHTHALMOLOGY

## 2019-10-22 PROCEDURE — 99999 PR PBB SHADOW E&M-EST. PATIENT-LVL II: CPT | Mod: PBBFAC,,, | Performed by: OPHTHALMOLOGY

## 2019-10-22 NOTE — PROGRESS NOTES
HPI     Ref by Dr Rachel    Pt here for strabismus. Pt states she had strabismus sx OU when she was 3 YO. And one more in OD to tighten muscle. Pt states she cannot fully focus   on one object. Pt feels that OD is getting weaker and strains to focus   when using OD. Pt states it has gotten progressively worse. Pt denies she   sees double.     Last edited by Cynthia Singer on 10/22/2019 11:12 AM. (History)        ROS     Positive for: Eyes    Last edited by BRITNEY Morelos Jr., MD on 10/22/2019 11:39 AM.   (History)        Assessment /Plan     For exam results, see Encounter Report.    Exotropia    History of strabismus surgery      Consider surgical correction to correct exotropia. The details of the surgical procedure were discussed. The risks of the procedure were identified and explained. Treatment alternatives were listed.    LR recession OU, due to previous surgery, surgery plan may change.  Advised about over corrections after Exotropia surgery. Some over corrections heal with time, some will need repeat surgery.  Use of an adjustable suture to ensure a better outcome.  95% success rate with a 5% chance need for repeat surgery.       Lashell understands procedure and risk and would like to schedule     This service was scribed by Adri Galvez for, and in the presence of Dr Morelos who personally performed this service.    Adri Galvez, COA    Josefina Morelos MD

## 2019-11-08 NOTE — BRIEF OP NOTE
Brief Operative Note  Ophthalmology Service      Date of Procedure: 11/13/19     Attending Physician: BRITNEY Morelos Jr., MD     Assistant: WILMAR Leahy MD    Pre-Operative Diagnosis: Exotropia [H50.10]     Post-Operative Diagnosis: Same as pre-operative diagnosis    Treatments/Procedures: Recess LR OU 8.0 mm w/adj OD; Advance MR OD 4.0 mm    Intraoperative Findings: nl EOM's    Anesthesia: General    Complications: None    Estimated Blood Loss: < 5 cc    Specimens: None    -------------------------------------------------------------  Full dictated Operative Report to follow.  -------------------------------------------------------------

## 2019-11-12 ENCOUNTER — ANESTHESIA EVENT (OUTPATIENT)
Dept: SURGERY | Facility: HOSPITAL | Age: 44
End: 2019-11-12
Payer: COMMERCIAL

## 2019-11-12 ENCOUNTER — TELEPHONE (OUTPATIENT)
Dept: OPTOMETRY | Facility: CLINIC | Age: 44
End: 2019-11-12

## 2019-11-12 RX ORDER — CHOLECALCIFEROL (VITAMIN D3) 25 MCG
1000 TABLET ORAL DAILY
COMMUNITY

## 2019-11-12 NOTE — PRE-PROCEDURE INSTRUCTIONS
PREOP INSTRUCTIONS:No solid food ,milk or milk products for 8 hours prior to procedure.Clear liquids are allowed up to 2 hours beforeprocedure.Clear liquids are:water,apple juice,pedialyte,gatorade,& jello.Shower instructions as well as directions to the [facility] were given.Patient encouraged to wear loose fitting,comfortable clothing that preferably buttons up the front.Medication instructions for pm prior to and am of procedure reviewed.Instructed patient to avoid taking vitamins,supplements,aspirin and ibuprofen the morning of surgery.Patient stated an understanding.    Patient denies any side effects or issues with anesthesia or sedation.

## 2019-11-13 ENCOUNTER — ANESTHESIA (OUTPATIENT)
Dept: SURGERY | Facility: HOSPITAL | Age: 44
End: 2019-11-13
Payer: COMMERCIAL

## 2019-11-13 ENCOUNTER — HOSPITAL ENCOUNTER (OUTPATIENT)
Facility: HOSPITAL | Age: 44
Discharge: HOME | End: 2019-11-13
Attending: OPHTHALMOLOGY | Admitting: OPHTHALMOLOGY
Payer: COMMERCIAL

## 2019-11-13 VITALS
DIASTOLIC BLOOD PRESSURE: 62 MMHG | RESPIRATION RATE: 17 BRPM | SYSTOLIC BLOOD PRESSURE: 100 MMHG | HEIGHT: 67 IN | BODY MASS INDEX: 30.13 KG/M2 | TEMPERATURE: 98 F | OXYGEN SATURATION: 93 % | HEART RATE: 83 BPM | WEIGHT: 192 LBS

## 2019-11-13 DIAGNOSIS — H50.10 EXOTROPIA: ICD-10-CM

## 2019-11-13 PROCEDURE — 71000044 HC DOSC ROUTINE RECOVERY FIRST HOUR: Performed by: OPHTHALMOLOGY

## 2019-11-13 PROCEDURE — D9220A PRA ANESTHESIA: Mod: ,,, | Performed by: ANESTHESIOLOGY

## 2019-11-13 PROCEDURE — 25000003 PHARM REV CODE 250: Performed by: STUDENT IN AN ORGANIZED HEALTH CARE EDUCATION/TRAINING PROGRAM

## 2019-11-13 PROCEDURE — 67335 EYE SUTURE DURING SURGERY: CPT | Mod: RT,,, | Performed by: OPHTHALMOLOGY

## 2019-11-13 PROCEDURE — 63600175 PHARM REV CODE 636 W HCPCS: Performed by: STUDENT IN AN ORGANIZED HEALTH CARE EDUCATION/TRAINING PROGRAM

## 2019-11-13 PROCEDURE — 67312 PR STABISMUS SURG,TWO HORIZ MUSCLE: ICD-10-PCS | Mod: RT,,, | Performed by: OPHTHALMOLOGY

## 2019-11-13 PROCEDURE — 71000015 HC POSTOP RECOV 1ST HR: Performed by: OPHTHALMOLOGY

## 2019-11-13 PROCEDURE — 37000009 HC ANESTHESIA EA ADD 15 MINS: Performed by: OPHTHALMOLOGY

## 2019-11-13 PROCEDURE — D9220A PRA ANESTHESIA: ICD-10-PCS | Mod: ,,, | Performed by: ANESTHESIOLOGY

## 2019-11-13 PROCEDURE — 25000003 PHARM REV CODE 250

## 2019-11-13 PROCEDURE — 67311 REVISE EYE MUSCLE: CPT | Mod: 59,LT,, | Performed by: OPHTHALMOLOGY

## 2019-11-13 PROCEDURE — 36000706: Performed by: OPHTHALMOLOGY

## 2019-11-13 PROCEDURE — 67332 REREVISE EYE MUSCLES ADD-ON: CPT | Mod: RT,,, | Performed by: OPHTHALMOLOGY

## 2019-11-13 PROCEDURE — 67312 REVISE TWO EYE MUSCLES: CPT | Mod: RT,,, | Performed by: OPHTHALMOLOGY

## 2019-11-13 PROCEDURE — 36000707: Performed by: OPHTHALMOLOGY

## 2019-11-13 PROCEDURE — 71000016 HC POSTOP RECOV ADDL HR: Performed by: OPHTHALMOLOGY

## 2019-11-13 PROCEDURE — 37000008 HC ANESTHESIA 1ST 15 MINUTES: Performed by: OPHTHALMOLOGY

## 2019-11-13 PROCEDURE — 67332 PR STABISMUS SURG,SCAR EXTRAOCUL MUSC: ICD-10-PCS | Mod: RT,,, | Performed by: OPHTHALMOLOGY

## 2019-11-13 PROCEDURE — 67335 PR STABISMUS SURG,PLACE ADJUST SUTURE: ICD-10-PCS | Mod: RT,,, | Performed by: OPHTHALMOLOGY

## 2019-11-13 PROCEDURE — 25000003 PHARM REV CODE 250: Performed by: OPHTHALMOLOGY

## 2019-11-13 PROCEDURE — 67311 PR STABISMUS SURG,ONE HORIZ MUSCLE: ICD-10-PCS | Mod: 59,LT,, | Performed by: OPHTHALMOLOGY

## 2019-11-13 RX ORDER — MIDAZOLAM HYDROCHLORIDE 1 MG/ML
INJECTION, SOLUTION INTRAMUSCULAR; INTRAVENOUS
Status: DISCONTINUED | OUTPATIENT
Start: 2019-11-13 | End: 2019-11-13

## 2019-11-13 RX ORDER — TOBRAMYCIN AND DEXAMETHASONE 3; 1 MG/ML; MG/ML
SUSPENSION/ DROPS OPHTHALMIC
Status: DISCONTINUED | OUTPATIENT
Start: 2019-11-13 | End: 2019-11-13 | Stop reason: HOSPADM

## 2019-11-13 RX ORDER — HALOPERIDOL 5 MG/ML
1 INJECTION INTRAMUSCULAR
Status: DISCONTINUED | OUTPATIENT
Start: 2019-11-13 | End: 2019-11-13 | Stop reason: HOSPADM

## 2019-11-13 RX ORDER — SODIUM CHLORIDE 0.9 % (FLUSH) 0.9 %
10 SYRINGE (ML) INJECTION
Status: CANCELLED | OUTPATIENT
Start: 2019-11-13

## 2019-11-13 RX ORDER — PHENYLEPHRINE HYDROCHLORIDE 25 MG/ML
SOLUTION/ DROPS OPHTHALMIC
Status: DISCONTINUED | OUTPATIENT
Start: 2019-11-13 | End: 2019-11-13 | Stop reason: HOSPADM

## 2019-11-13 RX ORDER — SODIUM CHLORIDE 0.9 % (FLUSH) 0.9 %
3 SYRINGE (ML) INJECTION
Status: DISCONTINUED | OUTPATIENT
Start: 2019-11-13 | End: 2019-11-13 | Stop reason: HOSPADM

## 2019-11-13 RX ORDER — PHENYLEPHRINE HYDROCHLORIDE 25 MG/ML
SOLUTION/ DROPS OPHTHALMIC
Status: DISCONTINUED
Start: 2019-11-13 | End: 2019-11-13 | Stop reason: HOSPADM

## 2019-11-13 RX ORDER — ACETAMINOPHEN 325 MG/1
650 TABLET ORAL EVERY 6 HOURS PRN
Status: DISCONTINUED | OUTPATIENT
Start: 2019-11-13 | End: 2019-11-13 | Stop reason: HOSPADM

## 2019-11-13 RX ORDER — DEXMEDETOMIDINE HYDROCHLORIDE 100 UG/ML
INJECTION, SOLUTION INTRAVENOUS
Status: DISCONTINUED | OUTPATIENT
Start: 2019-11-13 | End: 2019-11-13

## 2019-11-13 RX ORDER — ONDANSETRON 2 MG/ML
INJECTION INTRAMUSCULAR; INTRAVENOUS
Status: DISCONTINUED | OUTPATIENT
Start: 2019-11-13 | End: 2019-11-13

## 2019-11-13 RX ORDER — ACETAMINOPHEN 10 MG/ML
INJECTION, SOLUTION INTRAVENOUS
Status: DISCONTINUED | OUTPATIENT
Start: 2019-11-13 | End: 2019-11-13

## 2019-11-13 RX ORDER — PROPOFOL 10 MG/ML
VIAL (ML) INTRAVENOUS
Status: DISCONTINUED | OUTPATIENT
Start: 2019-11-13 | End: 2019-11-13

## 2019-11-13 RX ORDER — ONDANSETRON 4 MG/1
4 TABLET, FILM COATED ORAL EVERY 6 HOURS PRN
Status: DISCONTINUED | OUTPATIENT
Start: 2019-11-13 | End: 2019-11-13 | Stop reason: HOSPADM

## 2019-11-13 RX ORDER — OXYCODONE HYDROCHLORIDE 5 MG/1
5 TABLET ORAL
Status: DISCONTINUED | OUTPATIENT
Start: 2019-11-13 | End: 2019-11-13 | Stop reason: HOSPADM

## 2019-11-13 RX ORDER — FENTANYL CITRATE 50 UG/ML
INJECTION, SOLUTION INTRAMUSCULAR; INTRAVENOUS
Status: DISCONTINUED | OUTPATIENT
Start: 2019-11-13 | End: 2019-11-13

## 2019-11-13 RX ORDER — ONDANSETRON 2 MG/ML
4 INJECTION INTRAMUSCULAR; INTRAVENOUS DAILY PRN
Status: DISCONTINUED | OUTPATIENT
Start: 2019-11-13 | End: 2019-11-13 | Stop reason: HOSPADM

## 2019-11-13 RX ORDER — DEXAMETHASONE SODIUM PHOSPHATE 4 MG/ML
INJECTION, SOLUTION INTRA-ARTICULAR; INTRALESIONAL; INTRAMUSCULAR; INTRAVENOUS; SOFT TISSUE
Status: DISCONTINUED | OUTPATIENT
Start: 2019-11-13 | End: 2019-11-13

## 2019-11-13 RX ORDER — SODIUM CHLORIDE 9 MG/ML
INJECTION, SOLUTION INTRAVENOUS CONTINUOUS PRN
Status: DISCONTINUED | OUTPATIENT
Start: 2019-11-13 | End: 2019-11-13

## 2019-11-13 RX ORDER — HYDROMORPHONE HYDROCHLORIDE 1 MG/ML
0.2 INJECTION, SOLUTION INTRAMUSCULAR; INTRAVENOUS; SUBCUTANEOUS EVERY 5 MIN PRN
Status: CANCELLED | OUTPATIENT
Start: 2019-11-13

## 2019-11-13 RX ORDER — FENTANYL CITRATE 50 UG/ML
25 INJECTION, SOLUTION INTRAMUSCULAR; INTRAVENOUS EVERY 5 MIN PRN
Status: DISCONTINUED | OUTPATIENT
Start: 2019-11-13 | End: 2019-11-13 | Stop reason: HOSPADM

## 2019-11-13 RX ADMIN — FENTANYL CITRATE 25 MCG: 50 INJECTION INTRAMUSCULAR; INTRAVENOUS at 10:11

## 2019-11-13 RX ADMIN — SODIUM CHLORIDE: 0.9 INJECTION, SOLUTION INTRAVENOUS at 08:11

## 2019-11-13 RX ADMIN — ONDANSETRON 4 MG: 2 INJECTION INTRAMUSCULAR; INTRAVENOUS at 08:11

## 2019-11-13 RX ADMIN — DEXAMETHASONE SODIUM PHOSPHATE 4 MG: 4 INJECTION, SOLUTION INTRAMUSCULAR; INTRAVENOUS at 08:11

## 2019-11-13 RX ADMIN — FENTANYL CITRATE 50 MCG: 50 INJECTION, SOLUTION INTRAMUSCULAR; INTRAVENOUS at 08:11

## 2019-11-13 RX ADMIN — MIDAZOLAM HYDROCHLORIDE 2 MG: 1 INJECTION, SOLUTION INTRAMUSCULAR; INTRAVENOUS at 08:11

## 2019-11-13 RX ADMIN — OXYCODONE HYDROCHLORIDE 5 MG: 5 TABLET ORAL at 10:11

## 2019-11-13 RX ADMIN — PROPOFOL 200 MG: 10 INJECTION, EMULSION INTRAVENOUS at 08:11

## 2019-11-13 RX ADMIN — DEXMEDETOMIDINE HYDROCHLORIDE 20 MCG: 100 INJECTION, SOLUTION, CONCENTRATE INTRAVENOUS at 08:11

## 2019-11-13 RX ADMIN — DEXMEDETOMIDINE HYDROCHLORIDE 20 MCG: 100 INJECTION, SOLUTION, CONCENTRATE INTRAVENOUS at 09:11

## 2019-11-13 RX ADMIN — ACETAMINOPHEN 1000 MG: 10 INJECTION, SOLUTION INTRAVENOUS at 08:11

## 2019-11-13 NOTE — PLAN OF CARE
Patient states they are ready to be discharged. Instructions and prescription given to patient and family. Both verbalize understanding. Patient tolerating po liquids with no difficulty. Patient states pain is at a tolerable level for them. Anesthesia consent and surgical consent in chart upon patient's discharge from [facility].

## 2019-11-13 NOTE — ANESTHESIA POSTPROCEDURE EVALUATION
Anesthesia Post Evaluation    Patient: Lashell Gonzalez    Procedure(s) Performed: Procedure(s) (LRB):  STRABISMUS SURGERY/with adjustable sutures (Bilateral)    Final Anesthesia Type: general  Patient location during evaluation: PACU  Patient participation: Yes- Able to Participate  Level of consciousness: awake and alert  Post-procedure vital signs: reviewed and stable  Pain management: adequate  Airway patency: patent  PONV status at discharge: No PONV  Anesthetic complications: no      Cardiovascular status: hemodynamically stable  Respiratory status: unassisted, spontaneous ventilation and room air  Hydration status: euvolemic  Follow-up not needed.          Vitals Value Taken Time   /62 11/13/2019 11:17 AM   Temp 36.7 °C (98 °F) 11/13/2019 11:15 AM   Pulse 85 11/13/2019 11:16 AM   Resp 17 11/13/2019 11:15 AM   SpO2 93 % 11/13/2019 11:16 AM   Vitals shown include unvalidated device data.      No case tracking events are documented in the log.      Pain/Jason Score: Pain Rating Prior to Med Admin: 7 (11/13/2019 10:36 AM)  Jason Score: 10 (11/13/2019 10:00 AM)         Immediate Post- Operative Note        PostOp Diagnosis: Right Iliac bone Bx    Procedure(s): CT guided renal bx      Estimated Blood Loss: Less than 5 ml        Complications: None            6/3/2019     8:46 AM     Wu Curtis

## 2019-11-13 NOTE — ANESTHESIA PREPROCEDURE EVALUATION
11/13/2019  Lashell Gonzalez is a 44 y.o., female with pmhx of multiple sclerosis and exotropia. Had previous strabismus procedure at age 12. No recent MS exacerbations.     Anesthesia Evaluation    I have reviewed the Patient Summary Reports.     I have reviewed the Medications.     Review of Systems  Anesthesia Hx:  No problems with previous Anesthesia  History of prior surgery of interest to airway management or planning: Previous anesthesia: General Denies Family Hx of Anesthesia complications.   Denies Personal Hx of Anesthesia complications.   Social:  Non-Smoker    Cardiovascular:  Cardiovascular Normal     Pulmonary:  Pulmonary Normal    Hepatic/GI:  Hepatic/GI Normal    Neurological:   Multiple sclerosis   Endocrine:  Endocrine Normal        Physical Exam  General:  Well nourished    Airway/Jaw/Neck:  Airway Findings: Mouth Opening: Normal Tongue: Normal  General Airway Assessment: Adult  Mallampati: II  TM Distance: Normal, at least 6 cm         Dental:  DENTAL FINDINGS: Normal   Chest/Lungs:  Chest/Lungs Findings: Clear to auscultation, Normal Respiratory Rate     Heart/Vascular:  Heart Findings: Rate: Normal  Rhythm: Regular Rhythm        Mental Status:  Mental Status Findings:  Cooperative, Alert and Oriented         Anesthesia Plan  Type of Anesthesia, risks & benefits discussed:  Anesthesia Type:  general  Patient's Preference:   Intra-op Monitoring Plan: standard ASA monitors  Intra-op Monitoring Plan Comments:   Post Op Pain Control Plan:   Post Op Pain Control Plan Comments:   Induction:   IV  Beta Blocker:  Patient is not currently on a Beta-Blocker (No further documentation required).       Informed Consent: Patient understands risks and agrees with Anesthesia plan.  Questions answered. Anesthesia consent signed with patient.  ASA Score: 3     Day of Surgery Review of History &  Physical:    H&P update referred to the surgeon.         Ready For Surgery From Anesthesia Perspective.

## 2019-11-13 NOTE — DISCHARGE INSTRUCTIONS
Discharge Instructions: After Your Surgery  Youve just had surgery. During surgery, you were given medicine called anesthesia to keep you relaxed and free of pain. After surgery, you may have some pain or nausea. This is common. Here are some tips for feeling better and getting well after surgery.     Stay on schedule with your medicine.   Going home  Your healthcare provider will show you how to take care of yourself when you go home. He or she will also answer your questions. Have an adult family member or friend drive you home. For the first 24 hours after your surgery:  · Do not drive or use heavy equipment.  · Do not make important decisions or sign legal papers.  · Do not drink alcohol.  · Have someone stay with you, if needed. He or she can watch for problems and help keep you safe.  Be sure to go to all follow-up visits with your healthcare provider. And rest after your surgery for as long as your healthcare provider tells you to.  Coping with pain  If you have pain after surgery, pain medicine will help you feel better. Take it as told, before pain becomes severe. Also, ask your healthcare provider or pharmacist about other ways to control pain. This might be with heat, ice, or relaxation. And follow any other instructions your surgeon or nurse gives you.  Tips for taking pain medicine  To get the best relief possible, remember these points:  · Pain medicines can upset your stomach. Taking them with a little food may help.  · Most pain relievers taken by mouth need at least 20 to 30 minutes to start to work.  · Taking medicine on a schedule can help you remember to take it. Try to time your medicine so that you can take it before starting an activity. This might be before you get dressed, go for a walk, or sit down for dinner.  · Constipation is a common side effect of pain medicines. Call your healthcare provider before taking any medicines such as laxatives or stool softeners to help ease constipation.  Also ask if you should skip any foods. Drinking lots of fluids and eating foods such as fruits and vegetables that are high in fiber can also help. Remember, do not take laxatives unless your surgeon has prescribed them.  · Drinking alcohol and taking pain medicine can cause dizziness and slow your breathing. It can even be deadly. Do not drink alcohol while taking pain medicine.  · Pain medicine can make you react more slowly to things. Do not drive or run machinery while taking pain medicine.  Your healthcare provider may tell you to take acetaminophen to help ease your pain. Ask him or her how much you are supposed to take each day. Acetaminophen or other pain relievers may interact with your prescription medicines or other over-the-counter (OTC) medicines. Some prescription medicines have acetaminophen and other ingredients. Using both prescription and OTC acetaminophen for pain can cause you to overdose. Read the labels on your OTC medicines with care. This will help you to clearly know the list of ingredients, how much to take, and any warnings. It may also help you not take too much acetaminophen. If you have questions or do not understand the information, ask your pharmacist or healthcare provider to explain it to you before you take the OTC medicine.  Managing nausea  Some people have an upset stomach after surgery. This is often because of anesthesia, pain, or pain medicine, or the stress of surgery. These tips will help you handle nausea and eat healthy foods as you get better. If you were on a special food plan before surgery, ask your healthcare provider if you should follow it while you get better. These tips may help:  · Do not push yourself to eat. Your body will tell you when to eat and how much.  · Start off with clear liquids and soup. They are easier to digest.  · Next try semi-solid foods, such as mashed potatoes, applesauce, and gelatin, as you feel ready.  · Slowly move to solid foods. Dont  eat fatty, rich, or spicy foods at first.  · Do not force yourself to have 3 large meals a day. Instead eat smaller amounts more often.  · Take pain medicines with a small amount of solid food, such as crackers or toast, to avoid nausea.     Call your surgeon if  · You still have pain an hour after taking medicine. The medicine may not be strong enough.  · You feel too sleepy, dizzy, or groggy. The medicine may be too strong.  · You have side effects like nausea, vomiting, or skin changes, such as rash, itching, or hives.       If you have obstructive sleep apnea  You were given anesthesia medicine during surgery to keep you comfortable and free of pain. After surgery, you may have more apnea spells because of this medicine and other medicines you were given. The spells may last longer than usual.   At home:  · Keep using the continuous positive airway pressure (CPAP) device when you sleep. Unless your healthcare provider tells you not to, use it when you sleep, day or night. CPAP is a common device used to treat obstructive sleep apnea.  · Talk with your provider before taking any pain medicine, muscle relaxants, or sedatives. Your provider will tell you about the possible dangers of taking these medicines.  Date Last Reviewed: 12/1/2016 © 2000-2017 [employer]. [address] [place] [address]. All rights reserved. This information is not intended as a substitute for professional medical care. Always follow your healthcare professional's instructions.        Admit Date: 11/13/19    Discharge Date: 11/13/19    Attending Physician: BRITNEY Morelos Jr., MD     Discharge Physician: Sindi Leahy MD    Discharged Condition: Good    Reason for Admission: Exotropia [H50.10]  Exotropia [H50.10]     Treatments/Procedures: Strabismus Surgery (see dictated report for details).    Hospital Course: Stable, dictated    Consults: None    Significant Diagnostic Studies: None    Disposition:  Home    Patient Instructions:   - Resume same diet as prior to surgery  -place tobradex ointment in both eyes 3 times each day for 4 days  - Resume activity as tolerated with no swimming for 1 week  - Apply ice packs to surgical eye(s) for 72 hours as tolerated  - Call the Ophthalmology clinic to schedule an appointment with Dr. Morelos in 4 week(s).    Patient Instructions:   Current Discharge Medication List      CONTINUE these medications which have NOT CHANGED    Details   doxepin (SINEQUAN) 10 MG capsule Take 10 mg by mouth nightly. Patient taking 6 mg  Refills: 3      duloxetine (CYMBALTA) 60 MG capsule Take 60 mg by mouth once daily.   Refills: 5      ferrous sulfate (IRON) 325 mg (65 mg iron) Tab tablet       FLECTOR 1.3 % PT12 JEM 1 PATCH TO PAINFUL AREA AND CHANGE Q 12 H  Refills: 5      hydrocodone-acetaminophen 7.5-325mg (NORCO) 7.5-325 mg per tablet Take 1 tablet by mouth every 8 (eight) hours as needed for Pain.      loratadine 10 mg Cap       melatonin 10 mg Tab       multivitamin capsule Take 1 capsule by mouth once daily.      pantoprazole (PROTONIX) 40 MG tablet TK 1 T PO BID  Refills: 0      pregabalin (LYRICA) 100 MG capsule TAKE 1 CAPSULE BY MOUTH FIVE TIMES DAILY  Qty: 150 capsule, Refills: 5    Associated Diagnoses: Neuropathic pain      pseudoephedrine (SUDAFED) 30 MG tablet Take 30 mg by mouth every 4 (four) hours as needed.      TECFIDERA 240 mg CpDR TAKE 1 CAPSULE BY MOUTH TWICE DAILY  Qty: 180 capsule, Refills: 0    Associated Diagnoses: Multiple sclerosis      tizanidine (ZANAFLEX) 6 mg capsule TAKE 1 CAPSULE BY MOUTH EVERY NIGHT AT BEDTIME AS NEEDED FOR SPASM  Qty: 30 capsule, Refills: 5    Associated Diagnoses: Muscle spasticity      vitamin D (VITAMIN D3) 1000 units Tab Take 1,000 Units by mouth once daily.      zolpidem (AMBIEN) 10 mg Tab Take 10 mg by mouth every evening.   Refills: 2      sumatriptan (IMITREX) 100 MG tablet       tranexamic acid 650 mg Tab Take 1,300 mg by mouth.  Take 2 tablets two times daily prn             Discharge Procedure Orders   Diet Adult Regular     Notify your health care provider if you experience any of the following:  severe uncontrolled pain     Notify your health care provider if you experience any of the following:  persistent nausea and vomiting or diarrhea     Notify your health care provider if you experience any of the following:  temperature >100.4     Activity as tolerated

## 2019-11-13 NOTE — TRANSFER OF CARE
"Anesthesia Transfer of Care Note    Patient: Lashell Gonzalez    Procedure(s) Performed: Procedure(s) (LRB):  STRABISMUS SURGERY/with adjustable sutures (Bilateral)    Patient location: PACU    Anesthesia Type: general    Transport from OR: Transported from OR on 6-10 L/min O2 by face mask with adequate spontaneous ventilation    Post pain: adequate analgesia    Post assessment: no apparent anesthetic complications    Post vital signs: stable    Level of consciousness: awake    Nausea/Vomiting: no nausea/vomiting    Complications: none    Transfer of care protocol was followed      Last vitals:   Visit Vitals  /85 (BP Location: Left arm, Patient Position: Lying)   Pulse 85   Temp 37.1 °C (98.8 °F) (Oral)   Resp 20   Ht 5' 7" (1.702 m)   Wt 87.1 kg (192 lb)   LMP 10/01/2019 (Exact Date)   SpO2 99%   Breastfeeding? No   BMI 30.07 kg/m²     "

## 2019-11-13 NOTE — DISCHARGE SUMMARY
Discharge Summary  Ophthalmology Service      Admit Date: 11/13/19    Discharge Date: 11/13/19    Attending Physician: BRITNEY Morelos Jr., MD     Discharge Physician: Sindi Leahy MD    Discharged Condition: Good    Reason for Admission: Exotropia [H50.10]  Exotropia [H50.10]     Treatments/Procedures: Strabismus Surgery (see dictated report for details).    Hospital Course: Stable, dictated    Consults: None    Significant Diagnostic Studies: None    Disposition: Home    Patient Instructions:   - Resume same diet as prior to surgery  -place tobradex ointment in both eyes 3 times each day for 4 days  - Resume activity as tolerated with no swimming for 1 week  - Apply ice packs to surgical eye(s) for 72 hours as tolerated  - Call the Ophthalmology clinic to schedule an appointment with Dr. Morelos in 4 week(s).    Patient Instructions:   Current Discharge Medication List      CONTINUE these medications which have NOT CHANGED    Details   doxepin (SINEQUAN) 10 MG capsule Take 10 mg by mouth nightly. Patient taking 6 mg  Refills: 3      duloxetine (CYMBALTA) 60 MG capsule Take 60 mg by mouth once daily.   Refills: 5      ferrous sulfate (IRON) 325 mg (65 mg iron) Tab tablet       FLECTOR 1.3 % PT12 JEM 1 PATCH TO PAINFUL AREA AND CHANGE Q 12 H  Refills: 5      hydrocodone-acetaminophen 7.5-325mg (NORCO) 7.5-325 mg per tablet Take 1 tablet by mouth every 8 (eight) hours as needed for Pain.      loratadine 10 mg Cap       melatonin 10 mg Tab       multivitamin capsule Take 1 capsule by mouth once daily.      pantoprazole (PROTONIX) 40 MG tablet TK 1 T PO BID  Refills: 0      pregabalin (LYRICA) 100 MG capsule TAKE 1 CAPSULE BY MOUTH FIVE TIMES DAILY  Qty: 150 capsule, Refills: 5    Associated Diagnoses: Neuropathic pain      pseudoephedrine (SUDAFED) 30 MG tablet Take 30 mg by mouth every 4 (four) hours as needed.      TECFIDERA 240 mg CpDR TAKE 1 CAPSULE BY MOUTH TWICE DAILY  Qty: 180 capsule, Refills: 0     Associated Diagnoses: Multiple sclerosis      tizanidine (ZANAFLEX) 6 mg capsule TAKE 1 CAPSULE BY MOUTH EVERY NIGHT AT BEDTIME AS NEEDED FOR SPASM  Qty: 30 capsule, Refills: 5    Associated Diagnoses: Muscle spasticity      vitamin D (VITAMIN D3) 1000 units Tab Take 1,000 Units by mouth once daily.      zolpidem (AMBIEN) 10 mg Tab Take 10 mg by mouth every evening.   Refills: 2      sumatriptan (IMITREX) 100 MG tablet       tranexamic acid 650 mg Tab Take 1,300 mg by mouth. Take 2 tablets two times daily prn             Discharge Procedure Orders   Diet Adult Regular     Notify your health care provider if you experience any of the following:  severe uncontrolled pain     Notify your health care provider if you experience any of the following:  persistent nausea and vomiting or diarrhea     Notify your health care provider if you experience any of the following:  temperature >100.4     Activity as tolerated

## 2019-11-13 NOTE — BRIEF OP NOTE
Brief Operative Note  Ophthalmology Service      Date of Procedure: 11/13/19     Attending Physician: BRITNEY Morelos Jr., MD     Assistant: WILMAR Leahy MD    Pre-Operative Diagnosis: Exotropia [H50.10]  Exotropia [H50.10]     Post-Operative Diagnosis: Same as pre-operative diagnosis    Treatments/Procedures: Recess LR OU 8.0 w/adj OD; Advance MR OD 4.0 mm    Intraoperative Findings: MR OD found 11.0 mm post to limbus    Anesthesia: General    Complications: None    Estimated Blood Loss: < 5 cc    Specimens: None    -------------------------------------------------------------  Full dictated Operative Report to follow.  -------------------------------------------------------------

## 2019-11-13 NOTE — OP NOTE
DATE OF PROCEDURE:  11/13/2019    SURGEON:  BRITNEY Morelos M.D.    ASSISTANT:  Dr. Mehta.    PREOPERATIVE DIAGNOSIS:  Consecutive exotropia. Reoperation    POSTOPERATIVE DIAGNOSIS:  Consecutive exotropia.    PROCEDURES PERFORMED:  Recession, lateral rectus, both eyes, 8.0 mm with   adjustable suture, right eye; advancement medial rectus, right eye, 4.0 mm;   reoperation.    BLOOD LOSS:  Less than 2 mL.    COMPLICATIONS:  None.    PROCEDURE IN DETAIL:  The patient was brought to the Operating Suite where   general intubation anesthesia was achieved.  Both eyes were prepped and draped   in sterile fashion.  A lid speculum was placed in the left eye.  Through an   inferior temporal fornix incision, the lateral rectus muscle was identified and   placed on a muscle hook.  It was cleared of its check ligaments anteriorly and a   double-armed 6-0 Vicryl suture passed through the muscle belly, 1 mm posterior   to the insertion.  Locked bites were placed in the middle and upper and lower   edge of the muscle.  The muscle was disinserted from the globe and reattached to   the sclera, 8.0 mm.  The conjunctiva was reapproximated.  Attention was   directed to the right eye where a similar procedure was done to the lateral   rectus muscle.  In this eye; however, an adjustable suture was used.  The 2 ends   of the double-armed suture were attached to the sclera at the insertion site   and a noose suture placed around the muscle suture and positioned 8.0 mm   distally.  The muscle was allowed to retract back this amount.  The conjunctiva   was reapproximated.  Through an inferior nasal fornix incision in the right eye,   the medial rectus muscle was identified and placed on a muscle hook.  It was   found to have been recessed to 11 mm from the limbus.  The muscle was placed on   a muscle hook and cleared of its check ligaments anteriorly and posteriorly.    The scarring between the underside of the conjunctiva and sclera was  sharply   dissected.  A 6-0 Vicryl suture was placed through the muscle belly, 1 mm   posterior to the insertion.  Locked bites were placed in the middle and upper   and lower edge of the muscle.  The muscle was disinserted from the globe and the   muscle advanced to 7 mm from the limbus providing an effective 4.0 mm   advancement.  The conjunctiva was reapproximated and all sutures were buried.    Betadine solution and Maxitrol ointment were placed in the eye.  The patient was   brought to the Recovery Room in good condition.      HE/IN  dd: 11/13/2019 11:14:50 (CST)  td: 11/13/2019 12:04:15 (CST)  Doc ID   #[id number]  Job ID #[id number]    CC:

## 2019-11-20 ENCOUNTER — PATIENT MESSAGE (OUTPATIENT)
Dept: OPHTHALMOLOGY | Facility: CLINIC | Age: 44
End: 2019-11-20

## 2019-11-21 ENCOUNTER — OFFICE VISIT (OUTPATIENT)
Dept: OPHTHALMOLOGY | Facility: CLINIC | Age: 44
End: 2019-11-21
Payer: COMMERCIAL

## 2019-11-21 ENCOUNTER — PATIENT MESSAGE (OUTPATIENT)
Dept: OPHTHALMOLOGY | Facility: CLINIC | Age: 44
End: 2019-11-21

## 2019-11-21 DIAGNOSIS — Z98.890 POST-OPERATIVE STATE: Primary | ICD-10-CM

## 2019-11-21 PROCEDURE — 99024 PR POST-OP FOLLOW-UP VISIT: ICD-10-PCS | Mod: S$GLB,,, | Performed by: OPHTHALMOLOGY

## 2019-11-21 PROCEDURE — 99024 POSTOP FOLLOW-UP VISIT: CPT | Mod: S$GLB,,, | Performed by: OPHTHALMOLOGY

## 2019-11-21 PROCEDURE — 99999 PR PBB SHADOW E&M-EST. PATIENT-LVL II: CPT | Mod: PBBFAC,,, | Performed by: OPHTHALMOLOGY

## 2019-11-21 PROCEDURE — 99999 PR PBB SHADOW E&M-EST. PATIENT-LVL II: ICD-10-PCS | Mod: PBBFAC,,, | Performed by: OPHTHALMOLOGY

## 2019-11-21 NOTE — PROGRESS NOTES
HPI     Recession, lateral rectus, both eyes, 8.0 mm with   adjustable suture, right eye; advancement medial rectus, right eye, 4.0   mm;   Reoperation.11/13/2019  1 week post op    Last edited by Dina Naranjo on 11/21/2019 12:20 PM. (History)            Assessment /Plan     For exam results, see Encounter Report.    Post-operative state      Advised slight vertical deviation, educated that was not addressed during the surgery     The patient has had the desired result of the surgical procedure.   RTC 1 month     This service was scribed by Adri Galvez for, and in the presence of Dr Morelos who personally performed this service.    Adri Galvez, COA    Josefina Morelos MD

## 2019-12-10 ENCOUNTER — PATIENT MESSAGE (OUTPATIENT)
Dept: NEUROLOGY | Facility: CLINIC | Age: 44
End: 2019-12-10

## 2019-12-10 DIAGNOSIS — G35 MULTIPLE SCLEROSIS: Primary | ICD-10-CM

## 2019-12-18 DIAGNOSIS — M79.2 NEUROPATHIC PAIN: ICD-10-CM

## 2019-12-18 RX ORDER — PREGABALIN 100 MG/1
CAPSULE ORAL
Qty: 150 CAPSULE | Refills: 0 | Status: SHIPPED | OUTPATIENT
Start: 2019-12-18 | End: 2020-02-21

## 2019-12-19 ENCOUNTER — TELEPHONE (OUTPATIENT)
Dept: NEUROLOGY | Facility: CLINIC | Age: 44
End: 2019-12-19

## 2019-12-19 ENCOUNTER — PATIENT MESSAGE (OUTPATIENT)
Dept: OPHTHALMOLOGY | Facility: CLINIC | Age: 44
End: 2019-12-19

## 2019-12-24 ENCOUNTER — DOCUMENTATION ONLY (OUTPATIENT)
Dept: NEUROLOGY | Facility: CLINIC | Age: 44
End: 2019-12-24

## 2019-12-24 ENCOUNTER — TELEPHONE (OUTPATIENT)
Dept: NEUROLOGY | Facility: CLINIC | Age: 44
End: 2019-12-24

## 2019-12-30 ENCOUNTER — TELEPHONE (OUTPATIENT)
Dept: OPHTHALMOLOGY | Facility: CLINIC | Age: 44
End: 2019-12-30

## 2019-12-31 ENCOUNTER — OFFICE VISIT (OUTPATIENT)
Dept: OPHTHALMOLOGY | Facility: CLINIC | Age: 44
End: 2019-12-31
Payer: COMMERCIAL

## 2019-12-31 ENCOUNTER — TELEPHONE (OUTPATIENT)
Dept: NEUROLOGY | Facility: CLINIC | Age: 44
End: 2019-12-31

## 2019-12-31 DIAGNOSIS — Z98.890 POST-OPERATIVE STATE: Primary | ICD-10-CM

## 2019-12-31 PROCEDURE — 99024 POSTOP FOLLOW-UP VISIT: CPT | Mod: S$GLB,,, | Performed by: OPHTHALMOLOGY

## 2019-12-31 PROCEDURE — 99024 PR POST-OP FOLLOW-UP VISIT: ICD-10-PCS | Mod: S$GLB,,, | Performed by: OPHTHALMOLOGY

## 2019-12-31 PROCEDURE — 99999 PR PBB SHADOW E&M-EST. PATIENT-LVL III: ICD-10-PCS | Mod: PBBFAC,,, | Performed by: OPHTHALMOLOGY

## 2019-12-31 PROCEDURE — 99999 PR PBB SHADOW E&M-EST. PATIENT-LVL III: CPT | Mod: PBBFAC,,, | Performed by: OPHTHALMOLOGY

## 2019-12-31 NOTE — TELEPHONE ENCOUNTER
----- Message from Sylwia Carter, Patient Care Assistant sent at 12/31/2019 12:34 PM CST -----  Contact: DELFINO MATHIS [[id number]]  Name of Who is Calling: DELFINO MATHIS [[id number]]    What is the request in detail: Requesting a call back in regards of blood work orders.  Please contact to further discuss and advise      Can the clinic reply by [facility]: No    [facility]   [phone]

## 2019-12-31 NOTE — PROGRESS NOTES
HPI     Recession, lateral rectus, both eyes, 8.0 mm with   adjustable suture, right eye; advancement medial rectus, right eye, 4.0   mm;   Reoperation.11/13/2019    1 month post op    Patient states that she has some residual redness OD. Denies any pain. C/o   f/b sensation OD. No other ocular complaints.    Last edited by Keyana Harry on 12/31/2019  1:46 PM. (History)        ROS     Positive for: Eyes    Last edited by BRITNEY Morelos Jr., MD on 12/31/2019  2:03 PM.   (History)        Assessment /Plan     For exam results, see Encounter Report.    Post-operative state      The patient has had the desired result of the surgical procedure.   Suture resolving, educated about suture response     RTC PRN     This service was scribed by Adri Galvez for, and in the presence of Dr Morelos who personally performed this service.    Adri Galvez, COA    Josefina Morelos MD

## 2020-01-07 RX ORDER — DIMETHYL FUMARATE 240 MG/1
1 CAPSULE ORAL 2 TIMES DAILY
Qty: 180 CAPSULE | Refills: 1 | Status: SHIPPED | OUTPATIENT
Start: 2020-01-07 | End: 2020-07-21 | Stop reason: SDUPTHER

## 2020-01-12 ENCOUNTER — PATIENT MESSAGE (OUTPATIENT)
Dept: OPHTHALMOLOGY | Facility: CLINIC | Age: 45
End: 2020-01-12

## 2020-01-13 ENCOUNTER — PATIENT MESSAGE (OUTPATIENT)
Dept: OPHTHALMOLOGY | Facility: CLINIC | Age: 45
End: 2020-01-13

## 2020-01-13 ENCOUNTER — PATIENT MESSAGE (OUTPATIENT)
Dept: NEUROLOGY | Facility: CLINIC | Age: 45
End: 2020-01-13

## 2020-02-20 DIAGNOSIS — M79.2 NEUROPATHIC PAIN: ICD-10-CM

## 2020-02-21 RX ORDER — PREGABALIN 100 MG/1
CAPSULE ORAL
Qty: 150 CAPSULE | Refills: 1 | Status: SHIPPED | OUTPATIENT
Start: 2020-02-21 | End: 2020-07-01

## 2020-03-02 ENCOUNTER — DOCUMENTATION ONLY (OUTPATIENT)
Dept: NEUROLOGY | Facility: CLINIC | Age: 45
End: 2020-03-02

## 2020-05-04 ENCOUNTER — PATIENT MESSAGE (OUTPATIENT)
Dept: NEUROLOGY | Facility: CLINIC | Age: 45
End: 2020-05-04

## 2020-06-22 DIAGNOSIS — M79.2 NEUROPATHIC PAIN: ICD-10-CM

## 2020-06-30 ENCOUNTER — OFFICE VISIT (OUTPATIENT)
Dept: NEUROLOGY | Facility: CLINIC | Age: 45
End: 2020-06-30
Payer: COMMERCIAL

## 2020-06-30 DIAGNOSIS — G35 MULTIPLE SCLEROSIS: Primary | ICD-10-CM

## 2020-06-30 DIAGNOSIS — Z29.89 PROPHYLACTIC IMMUNOTHERAPY: ICD-10-CM

## 2020-06-30 DIAGNOSIS — Z79.899 HIGH RISK MEDICATION USE: ICD-10-CM

## 2020-06-30 DIAGNOSIS — R53.83 FATIGUE, UNSPECIFIED TYPE: ICD-10-CM

## 2020-06-30 DIAGNOSIS — M79.7 FIBROMYALGIA: ICD-10-CM

## 2020-06-30 DIAGNOSIS — M62.838 MUSCLE SPASM: ICD-10-CM

## 2020-06-30 DIAGNOSIS — Z71.89 COUNSELING REGARDING GOALS OF CARE: ICD-10-CM

## 2020-06-30 PROCEDURE — 99215 PR OFFICE/OUTPT VISIT, EST, LEVL V, 40-54 MIN: ICD-10-PCS | Mod: 95,,, | Performed by: CLINICAL NURSE SPECIALIST

## 2020-06-30 PROCEDURE — 99215 OFFICE O/P EST HI 40 MIN: CPT | Mod: 95,,, | Performed by: CLINICAL NURSE SPECIALIST

## 2020-06-30 RX ORDER — OMEPRAZOLE 20 MG/1
20 TABLET, DELAYED RELEASE ORAL DAILY
COMMUNITY

## 2020-06-30 RX ORDER — HYDROXYCHLOROQUINE SULFATE 200 MG/1
200 TABLET, FILM COATED ORAL 2 TIMES DAILY
COMMUNITY

## 2020-06-30 RX ORDER — TIZANIDINE 4 MG/1
4 TABLET ORAL NIGHTLY
COMMUNITY

## 2020-06-30 NOTE — PROGRESS NOTES
"Subjective:          Patient ID: Lashell Gonzalez is a 45 y.o. female who presents today for a routine clinic visit for MS.  She was last seen in September 2019. The history has been provided by the patient.     The patient location is: her home   The chief complaint leading to consultation is:     Visit type: audiovisual    Face to Face time with patient: 40 minutes   40 minutes of total time spent on the encounter, which includes face to face time and non-face to face time preparing to see the patient (eg, review of tests), Obtaining and/or reviewing separately obtained history, Documenting clinical information in the electronic or other health record, Independently interpreting results (not separately reported) and communicating results to the patient/family/caregiver, or Care coordination (not separately reported).     Each patient to whom he or she provides medical services by telemedicine is:  (1) informed of the relationship between the physician and patient and the respective role of any other health care provider with respect to management of the patient; and (2) notified that he or she may decline to receive medical services by telemedicine and may withdraw from such care at any time.    MS HPI:  · DMT: dimethyl fumarate twice daily   · Side effects from DMT? No  · Taking vitamin D3 as recommended? Yes -  Dose: not sure of dose   · She has had abdominal pain and diarrhea for 6 days. She has not been running fever or having any vomiting. She was tested for COVID this morning. She has not notified her PCP. She has also felt "a little stuffy" in the past few days.   · She had eye surgery in November for her "lazy eye," which has been helpful.   · She denies any change in her MS symptoms.   · She has been on Plaquenil for possible diagnosis of "ankylosing spondylitis."     Medications:  Current Outpatient Medications   Medication Sig    dimethyl fumarate (TECFIDERA) 240 mg CpDR Take 1 capsule by mouth 2 " (two) times daily.    doxepin (SINEQUAN) 10 MG capsule Take 10 mg by mouth nightly. Patient taking 6 mg    duloxetine (CYMBALTA) 60 MG capsule Take 60 mg by mouth once daily.     ferrous sulfate (IRON) 325 mg (65 mg iron) Tab tablet     FLECTOR 1.3 % PT12 JEM 1 PATCH TO PAINFUL AREA AND CHANGE Q 12 H    hydrocodone-acetaminophen 7.5-325mg (NORCO) 7.5-325 mg per tablet Take 1 tablet by mouth every 8 (eight) hours as needed for Pain.    loratadine 10 mg Cap     melatonin 10 mg Tab     multivitamin capsule Take 1 capsule by mouth once daily.    pantoprazole (PROTONIX) 40 MG tablet TK 1 T PO BID    pregabalin (LYRICA) 100 MG capsule TAKE 1 CAPSULE BY MOUTH FIVE TIMES DAILY    pseudoephedrine (SUDAFED) 30 MG tablet Take 30 mg by mouth every 4 (four) hours as needed.    sumatriptan (IMITREX) 100 MG tablet     tizanidine (ZANAFLEX) 6 mg capsule TAKE 1 CAPSULE BY MOUTH EVERY NIGHT AT BEDTIME AS NEEDED FOR SPASM    tranexamic acid 650 mg Tab Take 1,300 mg by mouth. Take 2 tablets two times daily prn    vitamin D (VITAMIN D3) 1000 units Tab Take 1,000 Units by mouth once daily.    zolpidem (AMBIEN) 10 mg Tab Take 10 mg by mouth every evening.        SOCIAL HISTORY  Social History     Tobacco Use    Smoking status: Never Smoker    Smokeless tobacco: Never Used   Substance Use Topics    Alcohol use: No    Drug use: Never       Living arrangements - the patient lives with her family.    ROS:    REVIEW OF SYMPTOMS 6/30/20   Do you feel abnormally tired on most days? Yes--worse in the heat   Do you feel you generally sleep well? No--some nights are better than others   Do you have difficulty controlling your bladder?  No--some urgency; denies UTIs    Do you have difficulty controlling your bowels?  Yes--has some irritable bowel-like symptoms, but has BM fairly regularly. She had Cologuard, which came back normal.   Do you have frequent muscle cramps, tightness or spasms in your limbs?  Yes--still has some  "spasms; takes tizanidine at night. Has tightness in her muscles    Do you have new visual symptoms?  No--stable    Do you have worsening difficulty with your memory or thinking? Yes--feels "scatterbrained," but this is not new. No significant change.    Do you have worsening symptoms of anxiety or depression?  Yes--she has had some anxiety; helping to care for her father-in-law in a nursing home and keep her parents safe during COVID    For patients who walk, Do you have more difficulty walking?  Yes--"somewhat clumsy."    Have you fallen since your last visit?  No   For patients who use wheelchairs: Do you have any skin wounds or breakdown? Not Applicable   Do you have difficulty using your hands?  No--not dropping things as often    Do you have shooting or burning pain? Yes--sees pain management; has been on Plaquenil recently, and she thinks this is helping. She takes Lyrica.   Do you have difficulty with sexual function?  Not assessed    If you are sexually active, are you using birth control? Y/N  N/A Not Applicable   Do you often choke when swallowing liquids or solid food?  No   Do you experience worsening symptoms when overheated? Yes   Do you need any new equipment such as a wheelchair, walker or shower chair? No   Do you receive co-pay financial assistance for your principal MS medicine? Yes   Would you be interested in participating in an MS research trial in the future? Yes   Do you feel you have adequate family/friend support?  Yes   Do you have health insurance?   Yes   Are you currently employed? No   Do you receive SSDI/SSI?  No   Do you use marijuana or cannabis products? No   Have you been diagnosed with a urinary tract infection since your last visit here? No   Have you been diagnosed with a respiratory tract infection since your last visit here? No   Have you been to the emergency room since your last visit here? No   Have you been hospitalized since your last visit here?  No   She understands " not to take any live virus vaccines.   She does not feel like she gets sick more often on Tecfidera and actually feels like she may have fewer sinus infections.           Objective:        Neurologic Exam  Deferred today d/y abdominal pain  Imaging:       Results for orders placed during the hospital encounter of 12/11/18   MRI Brain Demyelinating W W/O Contrast    Impression Findings in the cerebral white matter which are typical for multiple sclerosis.  No new or enhancing lesions to suggest active disease.      Electronically signed by: Agus Morales MD  Date:    12/12/2018  Time:    16:29       Labs:     Lab Results   Component Value Date    WLTSTGFH36TJ 83 05/13/2019    XMMPIMYM10OU 50 09/21/2018    FBTOXEQO67JB 86 05/21/2018     Lab Results   Component Value Date    BY7ZGJOF 72.6 05/13/2019    ABSOLUTECD3 1633 05/13/2019    XF1DWCDU 14.3 05/13/2019    ABSOLUTECD8 321 05/13/2019    CS4FRJJP 54.6 05/13/2019    ABSOLUTECD4 1226 05/13/2019    LABCD48 3.82 (H) 05/13/2019     Lab Results   Component Value Date    WBC 9.14 05/13/2019    RBC 4.44 05/13/2019    HGB 13.1 05/13/2019    HCT 41.0 05/13/2019    MCV 92 05/13/2019    MCH 29.5 05/13/2019    MCHC 32.0 05/13/2019    RDW 12.6 05/13/2019     05/13/2019    MPV 10.8 05/13/2019    GRAN 6.4 05/13/2019    GRAN 69.8 05/13/2019    LYMPH 2.1 05/13/2019    LYMPH 22.4 05/13/2019    MONO 0.6 05/13/2019    MONO 6.0 05/13/2019    EOS 0.1 05/13/2019    BASO 0.05 05/13/2019    EOSINOPHIL 1.0 05/13/2019    BASOPHIL 0.5 05/13/2019     Sodium   Date Value Ref Range Status   05/09/2014 142 136 - 145 mmol/L Final     Potassium   Date Value Ref Range Status   05/09/2014 3.6 3.5 - 5.1 mmol/L Final     Chloride   Date Value Ref Range Status   05/09/2014 107 95 - 110 mmol/L Final     CO2   Date Value Ref Range Status   05/09/2014 25 23 - 29 mmol/L Final     Glucose   Date Value Ref Range Status   05/09/2014 74 70 - 110 mg/dL Final     BUN, Bld   Date Value Ref Range Status    05/09/2014 18 6 - 20 mg/dL Final     Creatinine   Date Value Ref Range Status   05/09/2014 0.7 0.5 - 1.4 mg/dL Final     Calcium   Date Value Ref Range Status   05/09/2014 8.7 8.7 - 10.5 mg/dL Final     Total Protein   Date Value Ref Range Status   05/13/2019 7.6 6.0 - 8.4 g/dL Final     Albumin   Date Value Ref Range Status   05/13/2019 4.3 3.5 - 5.2 g/dL Final     Total Bilirubin   Date Value Ref Range Status   05/13/2019 0.3 0.1 - 1.0 mg/dL Final     Comment:     For infants and newborns, interpretation of results should be based  on gestational age, weight and in agreement with clinical  observations.  Premature Infant recommended reference ranges:  Up to 24 hours.............<8.0 mg/dL  Up to 48 hours............<12.0 mg/dL  3-5 days..................<15.0 mg/dL  6-29 days.................<15.0 mg/dL       Alkaline Phosphatase   Date Value Ref Range Status   05/13/2019 75 55 - 135 U/L Final     AST   Date Value Ref Range Status   05/13/2019 28 10 - 40 U/L Final     ALT   Date Value Ref Range Status   05/13/2019 29 10 - 44 U/L Final     Anion Gap   Date Value Ref Range Status   05/09/2014 10 8 - 16 mmol/L Final     eGFR if    Date Value Ref Range Status   05/09/2014 >60.0 >60 mL/min/1.73 m^2 Final     eGFR if non    Date Value Ref Range Status   05/09/2014 >60.0 >60 mL/min/1.73 m^2 Final     Comment:     Calculation used to obtain the estimated glomerular filtration  rate (eGFR) is the CKD-EPI equation. Since race is unknown   in our information system, the eGFR values for   -American and Non--American patients are given   for each creatinine result.           MS Impression and Plan:     NEURO MULTIPLE SCLEROSIS IMPRESSION:   MS Status:     Number of relapses in the past year?:  0    Clinical Progression:  Clinically Stable  Plan:     DMT:  No change in management    DMT comment:  Continue Tecfidera and Vitamin D. Will check safety labs in July--CBC, CD8, LFT, Vit  D. She is aware of the risks associated with immunosuppressant therapy, including increased risk of infection.     Lab orders mailed to patient.   Lyrica refilled today.    MRI in early December with f/u same day in the afternoon      Our visit today lasted 40 minutes. Over 50% of this visit included discussion of the treatment plan/medication changes/symptom management/coordination of care. The patient agrees with the plan of care.    RODRIGUE Pham, CNS    Problem List Items Addressed This Visit     None      Visit Diagnoses     Multiple sclerosis    -  Primary    Relevant Orders    CBC auto differential    Hepatic function panel    MRI Brain Demyelinating Without Contrast    Ehrhardt-Suppressor Ratio    Vitamin D    Counseling regarding goals of care        Prophylactic immunotherapy        High risk medication use        Muscle spasm        Fatigue, unspecified type        Fibromyalgia

## 2020-07-01 RX ORDER — PREGABALIN 100 MG/1
CAPSULE ORAL
Qty: 150 CAPSULE | Refills: 1 | Status: SHIPPED | OUTPATIENT
Start: 2020-07-01 | End: 2020-09-16

## 2020-07-09 ENCOUNTER — PATIENT MESSAGE (OUTPATIENT)
Dept: NEUROLOGY | Facility: CLINIC | Age: 45
End: 2020-07-09

## 2020-07-09 DIAGNOSIS — G35 MULTIPLE SCLEROSIS: ICD-10-CM

## 2020-07-21 RX ORDER — DIMETHYL FUMARATE 240 MG/1
1 CAPSULE ORAL 2 TIMES DAILY
Qty: 180 CAPSULE | Refills: 1 | Status: SHIPPED | OUTPATIENT
Start: 2020-07-21 | End: 2020-11-06 | Stop reason: SDUPTHER

## 2020-07-24 ENCOUNTER — DOCUMENTATION ONLY (OUTPATIENT)
Dept: NEUROLOGY | Facility: CLINIC | Age: 45
End: 2020-07-24

## 2020-09-15 DIAGNOSIS — M79.2 NEUROPATHIC PAIN: ICD-10-CM

## 2020-09-16 RX ORDER — PREGABALIN 100 MG/1
CAPSULE ORAL
Qty: 150 CAPSULE | Refills: 3 | Status: SHIPPED | OUTPATIENT
Start: 2020-09-16 | End: 2021-05-27

## 2020-09-29 ENCOUNTER — DOCUMENTATION ONLY (OUTPATIENT)
Dept: NEUROLOGY | Facility: CLINIC | Age: 45
End: 2020-09-29

## 2020-10-07 NOTE — PROGRESS NOTES
Per fax from [employer] plan covers brand-name Tecfidera. Tecfidera approved from 2/28/20-2/27/21.

## 2020-11-02 ENCOUNTER — PATIENT MESSAGE (OUTPATIENT)
Dept: PSYCHIATRY | Facility: CLINIC | Age: 45
End: 2020-11-02

## 2020-11-06 ENCOUNTER — PATIENT MESSAGE (OUTPATIENT)
Dept: NEUROLOGY | Facility: CLINIC | Age: 45
End: 2020-11-06

## 2020-11-06 DIAGNOSIS — G35 MULTIPLE SCLEROSIS: ICD-10-CM

## 2020-11-06 RX ORDER — DIMETHYL FUMARATE 240 MG/1
1 CAPSULE ORAL 2 TIMES DAILY
Qty: 180 CAPSULE | Refills: 0 | Status: SHIPPED | OUTPATIENT
Start: 2020-11-06

## 2020-11-09 ENCOUNTER — DOCUMENTATION ONLY (OUTPATIENT)
Dept: NEUROLOGY | Facility: CLINIC | Age: 45
End: 2020-11-09

## 2020-11-12 NOTE — PROGRESS NOTES
Notified via fax from [employer] that pt's dimethyl fumarate is approved from 2/28/20-2/27/21.

## 2020-12-02 ENCOUNTER — HOSPITAL ENCOUNTER (OUTPATIENT)
Dept: RADIOLOGY | Facility: HOSPITAL | Age: 45
Discharge: HOME | End: 2020-12-02
Attending: CLINICAL NURSE SPECIALIST
Payer: COMMERCIAL

## 2020-12-02 ENCOUNTER — OFFICE VISIT (OUTPATIENT)
Dept: NEUROLOGY | Facility: CLINIC | Age: 45
End: 2020-12-02
Payer: COMMERCIAL

## 2020-12-02 VITALS
DIASTOLIC BLOOD PRESSURE: 100 MMHG | HEIGHT: 67 IN | SYSTOLIC BLOOD PRESSURE: 153 MMHG | BODY MASS INDEX: 30.74 KG/M2 | HEART RATE: 101 BPM | WEIGHT: 195.88 LBS

## 2020-12-02 DIAGNOSIS — Z79.899 HIGH RISK MEDICATION USE: ICD-10-CM

## 2020-12-02 DIAGNOSIS — G35 MULTIPLE SCLEROSIS: ICD-10-CM

## 2020-12-02 DIAGNOSIS — R53.83 FATIGUE, UNSPECIFIED TYPE: ICD-10-CM

## 2020-12-02 DIAGNOSIS — Z29.89 PROPHYLACTIC IMMUNOTHERAPY: ICD-10-CM

## 2020-12-02 DIAGNOSIS — Z71.89 COUNSELING REGARDING GOALS OF CARE: ICD-10-CM

## 2020-12-02 DIAGNOSIS — G35 MULTIPLE SCLEROSIS: Primary | ICD-10-CM

## 2020-12-02 PROCEDURE — 99999 PR PBB SHADOW E&M-EST. PATIENT-LVL IV: CPT | Mod: PBBFAC,,, | Performed by: CLINICAL NURSE SPECIALIST

## 2020-12-02 PROCEDURE — 1125F PR PAIN SEVERITY QUANTIFIED, PAIN PRESENT: ICD-10-PCS | Mod: S$GLB,,, | Performed by: CLINICAL NURSE SPECIALIST

## 2020-12-02 PROCEDURE — 99999 PR PBB SHADOW E&M-EST. PATIENT-LVL IV: ICD-10-PCS | Mod: PBBFAC,,, | Performed by: CLINICAL NURSE SPECIALIST

## 2020-12-02 PROCEDURE — 3008F BODY MASS INDEX DOCD: CPT | Mod: CPTII,S$GLB,, | Performed by: CLINICAL NURSE SPECIALIST

## 2020-12-02 PROCEDURE — 1125F AMNT PAIN NOTED PAIN PRSNT: CPT | Mod: S$GLB,,, | Performed by: CLINICAL NURSE SPECIALIST

## 2020-12-02 PROCEDURE — 99215 PR OFFICE/OUTPT VISIT, EST, LEVL V, 40-54 MIN: ICD-10-PCS | Mod: S$GLB,,, | Performed by: CLINICAL NURSE SPECIALIST

## 2020-12-02 PROCEDURE — 99215 OFFICE O/P EST HI 40 MIN: CPT | Mod: S$GLB,,, | Performed by: CLINICAL NURSE SPECIALIST

## 2020-12-02 PROCEDURE — 70551 MRI BRAIN STEM W/O DYE: CPT | Mod: 26,,, | Performed by: RADIOLOGY

## 2020-12-02 PROCEDURE — 70551 MRI BRAIN DEMYELINATING WITHOUT CONTRAST: ICD-10-PCS | Mod: 26,,, | Performed by: RADIOLOGY

## 2020-12-02 PROCEDURE — 70551 MRI BRAIN STEM W/O DYE: CPT | Mod: TC

## 2020-12-02 PROCEDURE — 3008F PR BODY MASS INDEX (BMI) DOCUMENTED: ICD-10-PCS | Mod: CPTII,S$GLB,, | Performed by: CLINICAL NURSE SPECIALIST

## 2020-12-02 NOTE — PROGRESS NOTES
Subjective:          Patient ID: Lashell Gonzalez is a 45 y.o. female who presents today for a routine clinic visit for MS.  She was last seen in a virtual visit in June 2020. The history has been provided by the patient.     MS HPI:  · DMT: dimethyl fumarate was off of it for a week, but is back on (pharmacy delivery issue)  · Side effects from DMT? No  · Taking vitamin D3 as recommended? Yes -  Dose: 1000 units daily   · She has had more headaches lately, but feels like these are stress-related. They are not the extreme migraines that she has had in the past. She has also had neck pain and tightness, which is not new. She has taken Aleve and Ibuprofen, which she generally tries to avoid because of history of ulcers. She also takes Tylenol. She takes sumatriptan as needed, but has not had these lately.   · She is having left hip pain. She describes it as a deep, dull ache. It affects the side of the left thigh. She feels like sitting exacerbates it. She gets it on the right side, as well. The pain can wake her up at night. Stretching does help a bit. She is seeing pain management and had a steroid injection, which was helpful. She has not had any imaging on her hip.   · She has started taking magnesium, but is not sure of the dose.     Medications:  Current Outpatient Medications   Medication Sig    doxepin (SINEQUAN) 10 MG capsule Take 10 mg by mouth nightly. Patient takes sparingly.    duloxetine (CYMBALTA) 60 MG capsule Take 60 mg by mouth once daily.     ferrous sulfate (IRON) 325 mg (65 mg iron) Tab tablet     FLECTOR 1.3 % PT12 Apply 1 patch topically every 12 (twelve) hours. Uses for hip pain    hydrocodone-acetaminophen 7.5-325mg (NORCO) 7.5-325 mg per tablet Take 1 tablet by mouth every 8 (eight) hours as needed for Pain.    hydroxychloroquine (PLAQUENIL) 200 mg tablet Take 200 mg by mouth 2 (two) times daily.    loratadine 10 mg Cap Take 1 capsule by mouth once daily.     melatonin 10 mg Tab      multivitamin capsule Take 1 capsule by mouth once daily.    omeprazole (PRILOSEC OTC) 20 MG tablet Take 20 mg by mouth once daily.    pregabalin (LYRICA) 100 MG capsule TAKE 1 CAPSULE BY MOUTH FIVE TIMES DAILY    pseudoephedrine (SUDAFED) 30 MG tablet Take 30 mg by mouth every 4 (four) hours as needed.    sumatriptan (IMITREX) 100 MG tablet Take 100 mg by mouth once.     TECFIDERA 240 mg CpDR Take 1 capsule by mouth 2 (two) times daily.    tiZANidine (ZANAFLEX) 4 MG tablet Take 4 mg by mouth every evening.    tranexamic acid 650 mg Tab Take 1,300 mg by mouth. Take 2 tablets two times daily prn    vitamin D (VITAMIN D3) 1000 units Tab Take 1,000 Units by mouth once daily.    zolpidem (AMBIEN) 10 mg Tab Take 10 mg by mouth every evening.      SOCIAL HISTORY  Social History     Tobacco Use    Smoking status: Never Smoker    Smokeless tobacco: Never Used   Substance Use Topics    Alcohol use: No    Drug use: Never       Living arrangements - the patient lives with her family.  Job: doing some bubba writing     ROS:  REVIEW OF SYMPTOMS 12/2/20   Do you feel abnormally tired on most days? Yes   Do you feel you generally sleep well? No--interrupted at night by pain   Do you have difficulty controlling your bladder?  No--does not urinate a lot, but feels like he is emptying completely.    Do you have difficulty controlling your bowels?  No--has had more loose stool lately; has had to take some anti-diarrhea medication. Sometimes has constipation. She had Cologuard done in the last 6 months and this was normal. She denies any blood in the stool.    Do you have frequent muscle cramps, tightness or spasms in your limbs?  Yes--feels generalized tightness; no involuntary movements; tries to stretch   Do you have new visual symptoms?  No   Do you have worsening difficulty with your memory or thinking? Yes--occasionally forgets things; minor things; job performance is good    Do you have worsening symptoms of  anxiety or depression?  Yes; has felt more anxious; taking care of her mom; son is going through some struggles; lost her dad earlier this year from COVID   For patients who walk, Do you have more difficulty walking?  No   Have you fallen since your last visit?  No   For patients who use wheelchairs: Do you have any skin wounds or breakdown? Not Applicable   Do you have difficulty using your hands?  No   Do you have shooting or burning pain? Yes; some hip pain and headaches    Do you have difficulty with sexual function?  Yes; has low libido; she is not sure if this is MS or fatigue    If you are sexually active, are you using birth control? Y/N  N/A Not Applicable   Do you often choke when swallowing liquids or solid food?  No   Do you experience worsening symptoms when overheated? Yes; bothered by heat, but not cold.    Do you need any new equipment such as a wheelchair, walker or shower chair? No   Do you receive co-pay financial assistance for your principal MS medicine? Yes   Would you be interested in participating in an MS research trial in the future? Yes   Do you feel you have adequate family/friend support?  Yes   Do you have health insurance?   Yes   Are you currently employed? No   Do you receive SSDI/SSI?  No   Do you use marijuana or cannabis products? No   Have you been diagnosed with a urinary tract infection since your last visit here? No   Have you been diagnosed with a respiratory tract infection since your last visit here? No   Have you been to the emergency room since your last visit here? No   Have you been hospitalized since your last visit here?  No     FSS SCORE & INTERPRETATION 12/2/2020   FSS SCORE  57   FSS SCORE INTERPRETATION May be suffering from fatigue     MS KADEN-D SCORE & INTERPRETATION 12/2/2020   KADEN-D SCORE  17   KADEN-D INTERPRETATION  Mild to moderate Depression     MS ZOEY-7 SCORE & INTERPRETATION 12/2/2020   ZOEY-7 SCORE  10   ZOEY-7 SCORE INTERPRETATION Moderate Anxiety     PEQ  MS MOS PAIN EFFECTS SCORE & INTERPRETATION 12/2/2020   PES SCORE 21   PES SCORE INTERPRETATION Scores can range from 6-30.  Items are scaled so that higher scores indicate a greater impact of pain on a patients mood and behavior.     PEQ MS SEXUAL SATISFACTION SCORE & INTERPRETATION 12/2/2020   SSS SCORE  10   SSS SCORE INTERPRETATION Scores can range from 4-24.  Higher scores indicate greater problems with sexual satisfaction.     MS BLADDER CONTROL SCORE & INTERPRETATION 12/2/2020   BLCS SCORE 0   BLCS SCORE INTERPRETATION  Scores can range from 0-22, with higher scores indicating greater bladder control problems.     MS BOWEL CONTROL SCORE & INTERPRETATION 12/2/2020   BWCS SCORE 6   BWCS SCORE INTERPRETATION Scores can range from 0-26, with higher scores indicating greater bowel control problems.     PEQ MS IMPACT OF VISUAL IMPAIRMENT SCORE & INTERPRETATION 12/2/2020   JOSÉ SCALE SCORE  3   JOSÉ SCORE INTERPRETATION Scores can range from 0-15, with higher scores indicating greater impact of visual problems on daily activites.     MS PDQ SCORE & INTERPRETATION 12/2/2020   PDQ RETROSPECTIVE MEMORY SUBSCALE 8   PDQ ATTENTION/CONCENTRATION SUBSCALE 12   PDQ PROSPECTIVE MEMORY SUBSCALE 8   PDQ PLANNING/ORGANIZATION SUBSCALE 14   PDQ TOTAL SCORE 42   PDQ SCORE INTERPRETATION Scores can range from 0-80, with higher scores indicating greater perceived cognitive impairment.     MSSS SCORE & INTERPRETATION 12/2/2020   MSSS TANGIBLE SUPPORT SUBSCALE 37.5   MSSS EMOTIONAL/INFORMATIONAL SUPPORT SUBSCALE 71.88   MSSS AFFECTIONATE SUPPORT SUBSCALE 75   MSSS POSITIVE SOCIAL INTERACTION SUBSCALE 58.33   MSSS TOTAL SCORE 60.68   MSSS SCORE INTERPRETATION Scores can range from 0-100, with higher scores indicating greater perceived support.                Objective:      T25FW: walk time not recorded, but gait was stable; slightly wide-based  Neurologic Exam     Mental Status   Oriented to person, place, and time.   Attention:  normal. Concentration: normal.   Speech: speech is normal   Level of consciousness: alert  Knowledge: good.   Normal comprehension.     Cranial Nerves     CN III, IV, VI   Pupils are equal, round, and reactive to light.  Right pupil: Shape: regular.   Left pupil: Shape: regular.   Nystagmus: none     CN V   Right facial sensation deficit: none  Left facial sensation deficit: none    CN VII   Right facial weakness: none  Left facial weakness: none    CN VIII   Hearing: intact    CN IX, X   Palate: symmetric    CN XI   Right sternocleidomastoid strength: normal  Left sternocleidomastoid strength: normal  Right trapezius strength: normal  Left trapezius strength: normal    CN XII   Tongue deviation: none    Motor Exam   Muscle bulk: normal  Overall muscle tone: normal    Strength   Right neck flexion: 5/5  Left neck flexion: 5/5  Right neck extension: 5/5  Left neck extension: 5/5  Right deltoid: 5/5  Left deltoid: 5/5  Right biceps: 5/5  Left biceps: 5/5  Right triceps: 5/5  Left triceps: 5/5  Right wrist flexion: 5/5  Left wrist flexion: 5/5  Right wrist extension: 5/5  Left wrist extension: 5/5  Right interossei: 5/5  Left interossei: 5/5  Right iliopsoas: 5/5  Left iliopsoas: 5/5  Right quadriceps: 5/5  Left quadriceps: 5/5  Right hamstrin/5  Left hamstrin/5  Right anterior tibial: 5/5  Left anterior tibial: 5/5  Right gastroc: 5/5  Left gastroc: 5/5    Sensory Exam   Right arm vibration: normal  Left arm vibration: normal  Right leg vibration: decreased from knee  Left leg vibration: decreased from knee    Mild decrease in vibratory sense to feet.        Gait, Coordination, and Reflexes     Gait  Gait: normal (stable)    Coordination   Romberg: negative  Finger to nose coordination: normal  Heel to shin coordination: normal  Tandem walking coordination: normal    Tremor   Resting tremor: absent    Reflexes   Right brachioradialis: 2+  Left brachioradialis: 2+  Right biceps: 2+  Left biceps: 2+  Right  triceps: 2+  Left triceps: 2+  Right patellar: 3+  Left patellar: 3+  Right achilles: 2+  Left achilles: 2+  Right plantar: normal  Left plantar: normal        Imaging:     Narrative & Impression     EXAMINATION:  MRI BRAIN DEMYELINATING WITHOUT CONTRAST     CLINICAL HISTORY:  MS; Multiple sclerosis     TECHNIQUE:  Multiplanar multisequence MR imaging of the brain was performed without the administration of contrast.     COMPARISON:  MRI 12/11/2018, 12/11/2017     FINDINGS:  Ventricles are stable in size without hydrocephalus.     Redemonstration of several T2/FLAIR hyperintense foci throughout the supratentorial white matter, stable when compared to MRI 12/11/2018 accounting for differences in technique.  No definite new lesions identified.  No parenchymal mass, hemorrhage, or infarct.     No extra-axial blood or fluid collections.     The T2 skull base flow voids are preserved.  Bone marrow signal intensity unremarkable.     Impression:     Findings in the supratentorial white matter in keeping with reported history of multiple sclerosis.  No definite new lesions when compared to MRI 12/11/2018.     Electronically signed by resident: Piero Henriquez  Date:                                            12/02/2020  Time:                                           11:34     Electronically signed by: Gage No MD  Date:                                            12/02/2020  Time:                                           12:08     Images reviewed with the patient.     Labs:     Lab Results   Component Value Date    YEPWNVLF55RT 83 05/13/2019    XFREOSSQ59BP 50 09/21/2018    EUHMQAOS22RO 86 05/21/2018     Lab Results   Component Value Date    JCVINDEX SEE COMMENT (A) 01/04/2016    JCVANTIBODY SEE COMMENT 01/04/2016     Lab Results   Component Value Date    XI4KHUYB 72.6 05/13/2019    ABSOLUTECD3 1633 05/13/2019    IU4CKCVJ 14.3 05/13/2019    ABSOLUTECD8 321 05/13/2019    PS2LRGYG 54.6 05/13/2019    ABSOLUTECD4 1226  05/13/2019    LABCD48 3.82 (H) 05/13/2019     Lab Results   Component Value Date    WBC 9.14 05/13/2019    RBC 4.44 05/13/2019    HGB 13.1 05/13/2019    HCT 41.0 05/13/2019    MCV 92 05/13/2019    MCH 29.5 05/13/2019    MCHC 32.0 05/13/2019    RDW 12.6 05/13/2019     05/13/2019    MPV 10.8 05/13/2019    GRAN 6.4 05/13/2019    GRAN 69.8 05/13/2019    LYMPH 2.1 05/13/2019    LYMPH 22.4 05/13/2019    MONO 0.6 05/13/2019    MONO 6.0 05/13/2019    EOS 0.1 05/13/2019    BASO 0.05 05/13/2019    EOSINOPHIL 1.0 05/13/2019    BASOPHIL 0.5 05/13/2019     Sodium   Date Value Ref Range Status   05/09/2014 142 136 - 145 mmol/L Final     Potassium   Date Value Ref Range Status   05/09/2014 3.6 3.5 - 5.1 mmol/L Final     Chloride   Date Value Ref Range Status   05/09/2014 107 95 - 110 mmol/L Final     CO2   Date Value Ref Range Status   05/09/2014 25 23 - 29 mmol/L Final     Glucose   Date Value Ref Range Status   05/09/2014 74 70 - 110 mg/dL Final     BUN   Date Value Ref Range Status   05/09/2014 18 6 - 20 mg/dL Final     Creatinine   Date Value Ref Range Status   05/09/2014 0.7 0.5 - 1.4 mg/dL Final     Calcium   Date Value Ref Range Status   05/09/2014 8.7 8.7 - 10.5 mg/dL Final     Total Protein   Date Value Ref Range Status   05/13/2019 7.6 6.0 - 8.4 g/dL Final     Albumin   Date Value Ref Range Status   05/13/2019 4.3 3.5 - 5.2 g/dL Final     Total Bilirubin   Date Value Ref Range Status   05/13/2019 0.3 0.1 - 1.0 mg/dL Final     Comment:     For infants and newborns, interpretation of results should be based  on gestational age, weight and in agreement with clinical  observations.  Premature Infant recommended reference ranges:  Up to 24 hours.............<8.0 mg/dL  Up to 48 hours............<12.0 mg/dL  3-5 days..................<15.0 mg/dL  6-29 days.................<15.0 mg/dL       Alkaline Phosphatase   Date Value Ref Range Status   05/13/2019 75 55 - 135 U/L Final     AST   Date Value Ref Range Status    05/13/2019 28 10 - 40 U/L Final     ALT   Date Value Ref Range Status   05/13/2019 29 10 - 44 U/L Final     Anion Gap   Date Value Ref Range Status   05/09/2014 10 8 - 16 mmol/L Final     eGFR if    Date Value Ref Range Status   05/09/2014 >60.0 >60 mL/min/1.73 m^2 Final     eGFR if non    Date Value Ref Range Status   05/09/2014 >60.0 >60 mL/min/1.73 m^2 Final     Comment:     Calculation used to obtain the estimated glomerular filtration  rate (eGFR) is the CKD-EPI equation. Since race is unknown   in our information system, the eGFR values for   -American and Non--American patients are given   for each creatinine result.           MS Impression and Plan:     NEURO MULTIPLE SCLEROSIS IMPRESSION:   MS Status:     Number of relapses in the past year?:  0    Clinical Progression:  Clinically Stable    MRI Progression:  Stable  Plan:     DMT:  No change in management    DMT comment:  Continue Tecfidera and Vitamin D. Will check labs for CBC, CD8, LFT, and Vitamin D today. She is aware of the risks associated with immunosuppressant therapy, including increased risk of infection.       Symptom Management:  No change in symptom management     MRIs every 2 years; due next in December 2022.   She will follow up with Dr. Whiting in April.       Our visit today lasted 40 minutes, and 100% of this time was spent face to face with the patient. Over 50% of this visit included discussion of the treatment plan/medications/symptoms/exam findings/imaging results/coordination of care. The patient agrees with the plan of care.    RODRIGUE Pham, CNS     Problem List Items Addressed This Visit     None      Visit Diagnoses     Multiple sclerosis    -  Primary    Relevant Orders    CBC auto differential (Completed)    [place]-Suppressor Ratio (Completed)    Hepatic function panel (Completed)    Vitamin D (Completed)

## 2020-12-09 ENCOUNTER — PATIENT MESSAGE (OUTPATIENT)
Dept: NEUROLOGY | Facility: CLINIC | Age: 45
End: 2020-12-09

## 2021-01-21 NOTE — TELEPHONE ENCOUNTER
----- Message from Agnes Roberts sent at 2/15/2018  2:06 PM CST -----  Contact: Kaia ([facility] pharmacy) @ [phone]  Calling to get authorization for medication TECFIDERA 240 mg CpDR. Please call auth in to tel#  [phone] ([employer]).   Hal 45 Transitions Initial Follow Up Call    Outreach made within 2 business days of discharge: Yes    Patient: Minor Mckinley Patient : 1959   MRN: [id number]  Reason for Admission: There are no discharge diagnoses documented for the most recent discharge. Discharge Date: 21       Spoke with: Left voicemail for patient to call office with questions, concerns and to schedule HFU.       Discharge department/facility: [facility]  Scheduled appointment with PCP within 7-14 days    Follow Up  Future Appointments   Date Time Provider Jeremías Hair   2021 10:30 AM EMILIA Moulton - CNP  [facility]   2021  9:15 AM Danilo Moura MD  [facility]   2021  9:15 AM MD THIERRY Jacobo [facility]       Anastacio Francisco MA

## 2021-02-05 ENCOUNTER — PATIENT MESSAGE (OUTPATIENT)
Dept: NEUROLOGY | Facility: CLINIC | Age: 46
End: 2021-02-05

## 2021-03-10 ENCOUNTER — PATIENT MESSAGE (OUTPATIENT)
Dept: NEUROLOGY | Facility: CLINIC | Age: 46
End: 2021-03-10

## 2021-04-05 ENCOUNTER — PATIENT MESSAGE (OUTPATIENT)
Dept: NEUROLOGY | Facility: CLINIC | Age: 46
End: 2021-04-05

## 2021-04-06 ENCOUNTER — OFFICE VISIT (OUTPATIENT)
Dept: NEUROLOGY | Facility: CLINIC | Age: 46
End: 2021-04-06
Payer: COMMERCIAL

## 2021-04-06 DIAGNOSIS — Z29.89 PROPHYLACTIC IMMUNOTHERAPY: ICD-10-CM

## 2021-04-06 DIAGNOSIS — Z79.899 HIGH RISK MEDICATION USE: ICD-10-CM

## 2021-04-06 DIAGNOSIS — Z71.89 COUNSELING REGARDING GOALS OF CARE: ICD-10-CM

## 2021-04-06 DIAGNOSIS — G35 MULTIPLE SCLEROSIS: Primary | ICD-10-CM

## 2021-04-06 PROCEDURE — 99214 PR OFFICE/OUTPT VISIT, EST, LEVL IV, 30-39 MIN: ICD-10-PCS | Mod: 95,,, | Performed by: CLINICAL NURSE SPECIALIST

## 2021-04-06 PROCEDURE — 99214 OFFICE O/P EST MOD 30 MIN: CPT | Mod: 95,,, | Performed by: CLINICAL NURSE SPECIALIST

## 2021-04-06 RX ORDER — DIMETHYL FUMARATE 120 MG/1
120 CAPSULE ORAL 2 TIMES DAILY
Qty: 14 CAPSULE | Refills: 0 | Status: SHIPPED | OUTPATIENT
Start: 2021-04-06

## 2021-04-12 ENCOUNTER — DOCUMENTATION ONLY (OUTPATIENT)
Dept: NEUROLOGY | Facility: CLINIC | Age: 46
End: 2021-04-12

## 2021-04-20 ENCOUNTER — PATIENT MESSAGE (OUTPATIENT)
Dept: NEUROLOGY | Facility: CLINIC | Age: 46
End: 2021-04-20

## 2021-04-20 NOTE — PROGRESS NOTES
Notified via fax from [employer] that Tecfidera is denied as dimethyl fumarate is the preferred medication for this plan. Completed dimethyl fumarate PA via Loc[facility].

## 2021-05-12 ENCOUNTER — PATIENT MESSAGE (OUTPATIENT)
Dept: RESEARCH | Facility: HOSPITAL | Age: 46
End: 2021-05-12

## 2021-05-27 DIAGNOSIS — M79.2 NEUROPATHIC PAIN: ICD-10-CM

## 2021-05-27 RX ORDER — PREGABALIN 100 MG/1
CAPSULE ORAL
Qty: 150 CAPSULE | Refills: 3 | Status: SHIPPED | OUTPATIENT
Start: 2021-05-27 | End: 2021-10-08

## 2021-05-31 ENCOUNTER — PATIENT MESSAGE (OUTPATIENT)
Dept: PSYCHIATRY | Facility: CLINIC | Age: 46
End: 2021-05-31

## 2021-09-01 ENCOUNTER — PATIENT MESSAGE (OUTPATIENT)
Dept: PSYCHIATRY | Facility: CLINIC | Age: 46
End: 2021-09-01

## 2021-09-02 ENCOUNTER — PATIENT MESSAGE (OUTPATIENT)
Dept: PSYCHIATRY | Facility: CLINIC | Age: 46
End: 2021-09-02

## 2021-09-04 ENCOUNTER — PATIENT MESSAGE (OUTPATIENT)
Dept: NEUROLOGY | Facility: CLINIC | Age: 46
End: 2021-09-04

## 2021-10-08 DIAGNOSIS — M79.2 NEUROPATHIC PAIN: ICD-10-CM

## 2021-10-08 RX ORDER — PREGABALIN 100 MG/1
CAPSULE ORAL
Qty: 150 CAPSULE | Refills: 3 | Status: SHIPPED | OUTPATIENT
Start: 2021-10-08 | End: 2022-08-08

## 2021-12-21 ENCOUNTER — PATIENT MESSAGE (OUTPATIENT)
Dept: NEUROLOGY | Facility: CLINIC | Age: 46
End: 2021-12-21
Payer: COMMERCIAL

## 2022-02-28 ENCOUNTER — PATIENT MESSAGE (OUTPATIENT)
Dept: PSYCHIATRY | Facility: CLINIC | Age: 47
End: 2022-02-28
Payer: COMMERCIAL

## 2022-04-12 NOTE — PROGRESS NOTES
[facility] normal limits. KHL=[id number].   
Detail Level: Detailed
Show Aperture Variable?: Yes
Consent: The patient's consent was obtained including but not limited to risks of crusting, scabbing, blistering, scarring, darker or lighter pigmentary change, recurrence, incomplete removal and infection.
Number Of Freeze-Thaw Cycles: 1 freeze-thaw cycle
Render Note In Bullet Format When Appropriate: No
Duration Of Freeze Thaw-Cycle (Seconds): 0
Post-Care Instructions: I reviewed with the patient in detail post-care instructions. Patient is to wear sunprotection, and avoid picking at any of the treated lesions. Pt may apply Vaseline to crusted or scabbing areas.

## 2022-06-24 ENCOUNTER — PATIENT MESSAGE (OUTPATIENT)
Dept: PSYCHIATRY | Facility: CLINIC | Age: 47
End: 2022-06-24
Payer: COMMERCIAL

## 2022-08-05 ENCOUNTER — PATIENT MESSAGE (OUTPATIENT)
Dept: NEUROLOGY | Facility: CLINIC | Age: 47
End: 2022-08-05
Payer: COMMERCIAL

## 2022-08-08 ENCOUNTER — PATIENT MESSAGE (OUTPATIENT)
Dept: NEUROLOGY | Facility: CLINIC | Age: 47
End: 2022-08-08
Payer: COMMERCIAL

## 2022-12-01 ENCOUNTER — PATIENT MESSAGE (OUTPATIENT)
Dept: PSYCHIATRY | Facility: CLINIC | Age: 47
End: 2022-12-01
Payer: COMMERCIAL

## 2023-02-20 ENCOUNTER — PATIENT MESSAGE (OUTPATIENT)
Dept: PSYCHIATRY | Facility: CLINIC | Age: 48
End: 2023-02-20
Payer: COMMERCIAL

## 2023-07-21 ENCOUNTER — PATIENT MESSAGE (OUTPATIENT)
Dept: PSYCHIATRY | Facility: CLINIC | Age: 48
End: 2023-07-21
Payer: COMMERCIAL

## 2024-01-22 ENCOUNTER — PATIENT MESSAGE (OUTPATIENT)
Dept: PSYCHIATRY | Facility: CLINIC | Age: 49
End: 2024-01-22
Payer: COMMERCIAL

## 2024-03-19 ENCOUNTER — PATIENT MESSAGE (OUTPATIENT)
Dept: NEUROLOGY | Facility: CLINIC | Age: 49
End: 2024-03-19
Payer: COMMERCIAL

## 2024-05-02 ENCOUNTER — PATIENT MESSAGE (OUTPATIENT)
Dept: PSYCHIATRY | Facility: CLINIC | Age: 49
End: 2024-05-02
Payer: COMMERCIAL

## 2024-05-24 ENCOUNTER — HOSPITAL ENCOUNTER (EMERGENCY)
Facility: HOSPITAL | Age: 49
Discharge: HOME | End: 2024-05-24
Attending: FAMILY MEDICINE
Payer: COMMERCIAL

## 2024-05-24 VITALS
WEIGHT: 179.13 LBS | HEIGHT: 67 IN | TEMPERATURE: 98 F | BODY MASS INDEX: 28.11 KG/M2 | DIASTOLIC BLOOD PRESSURE: 101 MMHG | HEART RATE: 87 BPM | SYSTOLIC BLOOD PRESSURE: 158 MMHG | OXYGEN SATURATION: 99 % | RESPIRATION RATE: 18 BRPM

## 2024-05-24 DIAGNOSIS — J36 PERITONSILLAR ABSCESS: Primary | ICD-10-CM

## 2024-05-24 PROCEDURE — 99284 EMERGENCY DEPT VISIT MOD MDM: CPT

## 2024-05-24 RX ORDER — LIDOCAINE HYDROCHLORIDE AND EPINEPHRINE BITARTRATE 20; .01 MG/ML; MG/ML
1.7 INJECTION, SOLUTION SUBCUTANEOUS ONCE
Status: DISCONTINUED | OUTPATIENT
Start: 2024-05-24 | End: 2024-05-25 | Stop reason: HOSPADM

## 2024-05-24 RX ORDER — AMOXICILLIN AND CLAVULANATE POTASSIUM 875; 125 MG/1; MG/1
1 TABLET, FILM COATED ORAL 2 TIMES DAILY
Qty: 28 TABLET | Refills: 0 | Status: SHIPPED | OUTPATIENT
Start: 2024-05-24 | End: 2024-06-07

## 2024-05-24 RX ORDER — METHYLPREDNISOLONE 4 MG/1
TABLET ORAL
Qty: 1 EACH | Refills: 0 | Status: SHIPPED | OUTPATIENT
Start: 2024-05-24

## 2024-05-25 NOTE — CONSULTS
Otolaryngology - Consult Note    Patient Name: Lashell Gonzalez  Encounter Date: 05/24/2024  Date of Admission: 5/24/2024  YOB: 1975  Physician: Dima Arciniega      Reason for Consultation: tonsillar abscess      History of Present Illness: Lashell Gonzalez is a 49 y.o. female with PMHx of MS presents with 3-4 days of worsening sore throat, neck swelling, and trouble swallowing. It is worse on the right. She has never had anything like this before. She tried a few days of amoxicillin without relief. She presented to the ER in [place] where she got a CT scan showing abscess. She drove here on her own accord. Since arrival, she does feel better. She received abx and steroids at [place] ER. She is able to swallow, but does have some difficulty.       Past Medical History:   Diagnosis Date    History of stomach ulcers     2014    Multiple sclerosis     Strabismus        Current Outpatient Medications   Medication Instructions    amoxicillin-clavulanate 875-125mg (AUGMENTIN) 875-125 mg per tablet 1 tablet, Oral, 2 times daily    DULoxetine (CYMBALTA) 60 mg, Oral, Daily    ferrous sulfate (IRON) 325 mg (65 mg iron) Tab tablet No dose, route, or frequency recorded.    FLECTOR 1.3 % PT12 1 patch, Topical (Top), Every 12 hours, Uses for hip pain    hydrocodone-acetaminophen 7.5-325mg (NORCO) 7.5-325 mg per tablet 1 tablet, Oral, Every 8 hours PRN    hydroxychloroquine (PLAQUENIL) 200 mg, Oral, 2 times daily    loratadine 10 mg Cap 1 capsule, Oral, Daily    melatonin 10 mg Tab No dose, route, or frequency recorded.    methylPREDNISolone (MEDROL DOSEPACK) 4 mg tablet Take as directed on brina    multivitamin capsule 1 capsule, Daily    omeprazole (PRILOSEC OTC) 20 mg, Oral, Daily    pregabalin (LYRICA) 100 MG capsule TAKE 1 CAPSULE BY MOUTH FIVE TIMES DAILY    pseudoephedrine (SUDAFED) 30 mg, Every 4 hours PRN    sumatriptan (IMITREX) 100 mg, Oral, Once    TECFIDERA 240 mg CpDR 1 capsule,  "Oral, 2 times daily    TECFIDERA 120 mg, Oral, 2 times daily    tiZANidine (ZANAFLEX) 4 mg, Oral, Nightly    tranexamic acid (LYSTEDA) 1,300 mg, Oral, Take 2 tablets two times daily prn    vitamin D (VITAMIN D3) 1,000 Units, Oral, Daily    zolpidem (AMBIEN) 10 mg, Oral, Nightly       Past Surgical History:   Procedure Laterality Date    STRABISMUS SURGERY Bilateral 11/13/2019    Procedure: STRABISMUS SURGERY/with adjustable sutures;  Surgeon: BRITNEY Morelos Jr., MD;  Location: [facility] OR [address] [place] [address];  Service: Ophthalmology;  Laterality: Bilateral;       Review of patient's allergies indicates:  No Known Allergies        Objective:  Vitals:    05/24/24 2111 05/24/24 2153 05/24/24 2200   BP: (!) 170/110 (!) 171/94 (!) 162/101   BP Location: Left arm     Patient Position: Sitting     Pulse: 95 84 79   Resp: 18 18 18   Temp: 98.4 °F (36.9 °C)     TempSrc: Tympanic     SpO2: 98% 99% 99%   Weight: 81.3 kg (179 lb 2 oz)     Height: 5' 7" (1.702 m)         General Appearance: well nourished, well-developed, alert, oriented, in no acute distress, no dysphonia  Head/Face: Normocephalic, atraumatic  Eyes: EOMI, normal conjunctiva  Ears: Hears well at normal conversation volume  AD: external normal, ear canal normal, TM intact without effusion or retraction  AS: external normal, ear canal normal, TM intact without effusion or retraction  Nose: External nose normal, septum midline, no inferior turbinate hypertrophy, no epistaxis  Oral Cavity & Oropharynx: Lips normal. Tongue without masses or lesions. Dentition fair. Tonsils erythematous with right sided tonsillar fullness, No effacement of PG fold. No uvular deviation. Minimal trismus. No masses, lesions, or leukoplakia. Floor of mouth and base of tongue are soft.   Neck: Soft, right sided tender, palpable lymph nodes on righ. Thyroid without nodules or goiter.   Respiratory: Nonlabored breathing on room air. No stridor or stertor.  Neuro: CN II - XII intact  Psychiatric: oriented " to time, place and person, no depression, anxiety or agitation      Labs & Imaging  WBC: 12.8  CT: Right tonsillar abscess as evidence by tonsillar tissue lateral to the fluid collection, no obfuscation of the fat plane.     Assessment: Lashell Gonzalez is a 49 y.o. female with intratonsillar abscess that likely spontaneously drained en route. She is stable on RA and tolerating liquids.     Offered needle aspiration, but given improvement in symptoms and low chance of satisfactory aspirate, patient elected to trial more abx.       Plan:   - Okay to discharge on Augmentin 875 for 10-14 days.   - Recommend short course of steroids to help with swelling and pain.   - Counseled patient on return precautions.   - will arrange f/u in the next few weeks.   - Please call with questions or concerns.     Dima Arciniega  [facility] Otolaryngology PGY-3  05/24/2024 11:01 PM

## 2024-05-25 NOTE — ED PROVIDER NOTES
Encounter Date: 5/24/2024       History     Chief Complaint   Patient presents with    Sore Throat     States tonsillitis this week and diagnosed with peritonsillar abscess.   was seen at [facility] and was supposed to be a transfer.   can not afford the ambulance ride so came POV.   Airway patent, maintains secretions.       See MDM    The history is provided by the patient and medical records.     Review of patient's allergies indicates:  No Known Allergies  Past Medical History:   Diagnosis Date    History of stomach ulcers     2014    Multiple sclerosis     Strabismus      Past Surgical History:   Procedure Laterality Date    STRABISMUS SURGERY Bilateral 11/13/2019    Procedure: STRABISMUS SURGERY/with adjustable sutures;  Surgeon: BRITNEY Morelos Jr., MD;  Location: [facility] OR [address] [place] [address];  Service: Ophthalmology;  Laterality: Bilateral;     Family History   Problem Relation Name Age of Onset    Glaucoma Mother      Cataracts Mother       Social History     Tobacco Use    Smoking status: Never    Smokeless tobacco: Never   Substance Use Topics    Alcohol use: No    Drug use: Never     Review of Systems   Constitutional:  Negative for chills, fatigue and fever.   HENT:  Positive for sore throat and trouble swallowing. Negative for ear pain and rhinorrhea.    Eyes:  Negative for photophobia and pain.   Respiratory:  Negative for cough, shortness of breath and wheezing.    Cardiovascular:  Negative for chest pain.   Gastrointestinal:  Negative for abdominal pain, diarrhea, nausea and vomiting.   Genitourinary:  Negative for dysuria.   Neurological:  Negative for dizziness, weakness and headaches.   All other systems reviewed and are negative.      Physical Exam     Initial Vitals [05/24/24 2111]   BP Pulse Resp Temp SpO2   (!) 170/110 95 18 98.4 °F (36.9 °C) 98 %      MAP       --         Physical Exam    Nursing note and vitals reviewed.  Constitutional: She appears well-developed and well-nourished. No distress.    HENT:   Head: Normocephalic and atraumatic.   Mouth/Throat: Oropharynx is clear and moist.   Right peritonsillar fullness.   Eyes: Conjunctivae and EOM are normal. Pupils are equal, round, and reactive to light.   Neck: Neck supple.   Normal range of motion.  Cardiovascular:  Normal rate, regular rhythm, normal heart sounds and intact distal pulses.           Pulmonary/Chest: Breath sounds normal. No stridor. No respiratory distress. She has no wheezes. She has no rhonchi. She has no rales.   Abdominal: Abdomen is soft. Bowel sounds are normal. She exhibits no distension. There is no abdominal tenderness. There is no rebound and no guarding.   Musculoskeletal:         General: Normal range of motion.      Cervical back: Normal range of motion and neck supple.     Neurological: She is alert and oriented to person, place, and time.   Skin: Skin is warm and dry. Capillary refill takes less than 2 seconds. No erythema.   Psychiatric: She has a normal mood and affect. Her behavior is normal. Judgment and thought content normal.         ED Course   Procedures  Labs Reviewed - No data to display       Imaging Results    None          Medications   LIDOcaine-EPINEPHrine dental injection 1.7 mL (has no administration in time range)   LIDOcaine-EPINEPHrine dental injection 1.7 mL (has no administration in time range)     Medical Decision Making  48 y/o female transferred from [facility] for ENT evaluation due to right peritonsillar abscess.  Patient reports worsening sore throat and difficulty swallowing prior to presentation at [facility].  Patient reports feeling improved since receiving Steroids and antibiotics.    DDX: Right peritonsillar abscess.    Amount and/or Complexity of Data Reviewed  External Data Reviewed: notes.     Details: Medications received:  Unasyn 3g IV x 1  Solumedrol 125mg IV x 1  Toradol 30mg IV x 1      CT Neck with contrast 05/24/24:  1.4 x 0.9 x 2.2 cm peripherally enhancing  fluid collection in the right peritonsillar region considered highly suspicious for peritonsillar abscess.  There is extension of inflammatory/edematous changes along the right pharyngeal/parapharyngeal region as described avoce, inferiorly, to the left of the false vocal cords.    Risk  Prescription drug management.               ED Course as of 05/24/24 2252   Fri May 24, 2024   2125 Discussed with Dima Arciniega MD with ENT - will review images to determine of area amenable to drainage. [MW]   2244 ENT has seen and evaluated the patient.  Will arrange for outpatient followup.  Stable for DC to home.  Recommends Augmentin 10-14 days and medrol dose pack. [MW]   2247 On review of patient's medical record, patient had 90 hydrocodone 7.5 mg tablets filled on 04/22/2024, patient currently in pain management. [MW]      ED Course User Index  [MW] Marshall Cueva MD                             Clinical Impression:  Final diagnoses:  [J36] Peritonsillar abscess (Primary)          ED Disposition Condition    Discharge Stable          ED Prescriptions       Medication Sig Dispense Start Date End Date Auth. Provider    amoxicillin-clavulanate 875-125mg (AUGMENTIN) 875-125 mg per tablet Take 1 tablet by mouth 2 (two) times daily. for 14 days 28 tablet 5/24/2024 6/7/2024 Marshall Cueva MD    methylPREDNISolone (MEDROL DOSEPACK) 4 mg tablet Take as directed on brina 1 each 5/24/2024 -- Marshall Cueva MD          Follow-up Information       Follow up With Specialties Details Why Contact Info Additional Information    Shay Deluca MD Family Medicine   [address] [place] [id number]  [phone]       [facility] Emergency Medicine  As needed, If symptoms worsen [address] [facility] [address] [phone]     [facility], Entrance 6 Otolaryngology   [address] [facility] [address] [phone] [facility]  Clinic - ENT,  Entrance #6 Please sign with the  when you arrive.    [facility] Emergency Medicine  As needed, If symptoms worsen [address] [facility] [address] [phone]              Marshall Cueva MD  05/24/24 4027

## 2024-06-04 ENCOUNTER — TELEPHONE (OUTPATIENT)
Dept: NEUROLOGY | Facility: CLINIC | Age: 49
End: 2024-06-04
Payer: COMMERCIAL

## 2024-06-04 NOTE — TELEPHONE ENCOUNTER
Called pt to confirm her appt with Natalie Guaman on 6/5 at 11:30 AM. Pt stated she forgot about the appt and needs to r/s. Pt was currently in the middle of something, but said she will r/s that appt via the portal when she gets a chance. I told pt she can call us back if she has any issues with rescheduling. Pt verbalized understanding.

## 2024-06-05 ENCOUNTER — OFFICE VISIT (OUTPATIENT)
Dept: OTOLARYNGOLOGY | Facility: CLINIC | Age: 49
End: 2024-06-05
Payer: COMMERCIAL

## 2024-06-05 DIAGNOSIS — J03.90 TONSILLITIS: Primary | ICD-10-CM

## 2024-06-05 PROCEDURE — 99213 OFFICE O/P EST LOW 20 MIN: CPT | Mod: PBBFAC | Performed by: STUDENT IN AN ORGANIZED HEALTH CARE EDUCATION/TRAINING PROGRAM

## 2024-06-05 NOTE — TELEPHONE ENCOUNTER
Called pt because I noticed she was still scheduled for an appt with Natalie today (6/5) even though pt mentioned yesterday she had to r/s. Pt stated she was so busy yesterday and did not get a chance to r/s. Therefore, I attempted to r/s pt for AP's next available at the beginning of July, which pt could not do. I scheduled pt for an appt at the end of July, per pt's request. Pt agreed to appt on 7/29 at 11:30 AM. I will get Keyana to schedule appt since appt was defaulting as a new patient appt since pt was last seen in 2021.

## 2024-06-05 NOTE — PROGRESS NOTES
[facility]  Otolaryngology-Head & Neck Surgery    Office Visit    Lashell Gonzalez  [id number]  1975    CC: tonsillitis    HPI: Lashell Gonzalez is a 49 y.o. female with PMHX of MS who presented for tonsillar abscess on 5/24. Discharge on abx and steroids. Since then, she reports doing well. Still has 2 days of abx. No new issues. Tolerating food. Slight swelling to right neck    Review of patient's allergies indicates:  No Known Allergies    Past Medical History:   Diagnosis Date    History of stomach ulcers     2014    Multiple sclerosis     Strabismus        Past Surgical History:   Procedure Laterality Date    STRABISMUS SURGERY Bilateral 11/13/2019    Procedure: STRABISMUS SURGERY/with adjustable sutures;  Surgeon: BRITNEY Morelos Jr., MD;  Location: [facility] OR [address] [place] [address];  Service: Ophthalmology;  Laterality: Bilateral;       Social History     Socioeconomic History    Marital status:    Tobacco Use    Smoking status: Never    Smokeless tobacco: Never   Substance and Sexual Activity    Alcohol use: No    Drug use: Never    Sexual activity: Never       Family History   Problem Relation Name Age of Onset    Glaucoma Mother      Cataracts Mother         Outpatient Encounter Medications as of 6/5/2024   Medication Sig Dispense Refill    amoxicillin-clavulanate 875-125mg (AUGMENTIN) 875-125 mg per tablet Take 1 tablet by mouth 2 (two) times daily. for 14 days 28 tablet 0    duloxetine (CYMBALTA) 60 MG capsule Take 60 mg by mouth once daily.   5    ferrous sulfate (IRON) 325 mg (65 mg iron) Tab tablet       FLECTOR 1.3 % PT12 Apply 1 patch topically every 12 (twelve) hours. Uses for hip pain  5    hydrocodone-acetaminophen 7.5-325mg (NORCO) 7.5-325 mg per tablet Take 1 tablet by mouth every 8 (eight) hours as needed for Pain.      hydroxychloroquine (PLAQUENIL) 200 mg tablet Take 200 mg by mouth 2 (two) times daily.      loratadine 10 mg Cap Take 1 capsule by mouth once  daily.       melatonin 10 mg Tab       methylPREDNISolone (MEDROL DOSEPACK) 4 mg tablet Take as directed on brina 1 each 0    multivitamin capsule Take 1 capsule by mouth once daily.      omeprazole (PRILOSEC OTC) 20 MG tablet Take 20 mg by mouth once daily.      pregabalin (LYRICA) 100 MG capsule TAKE 1 CAPSULE BY MOUTH FIVE TIMES DAILY 150 capsule 0    pseudoephedrine (SUDAFED) 30 MG tablet Take 30 mg by mouth every 4 (four) hours as needed.      sumatriptan (IMITREX) 100 MG tablet Take 100 mg by mouth once.       TECFIDERA 120 mg CpDR Take 120 mg by mouth 2 (two) times daily. 14 capsule 0    TECFIDERA 240 mg CpDR Take 1 capsule by mouth 2 (two) times daily. 180 capsule 0    tiZANidine (ZANAFLEX) 4 MG tablet Take 4 mg by mouth every evening.      tranexamic acid 650 mg Tab Take 1,300 mg by mouth. Take 2 tablets two times daily prn      vitamin D (VITAMIN D3) 1000 units Tab Take 1,000 Units by mouth once daily.      zolpidem (AMBIEN) 10 mg Tab Take 10 mg by mouth every evening.   2     No facility-administered encounter medications on file as of 6/5/2024.       PHYSICAL EXAM:  There were no vitals filed for this visit.    General Appearance: well nourished, well-developed, alert, oriented, in no acute distress, no dysphonia  Head/Face: Normocephalic, atraumatic  Eyes: EOMI, normal conjunctiva  Ears: Hears well at normal conversation volume  Otomicroscopy:   AD: external normal, ear canal normal, TM intact without effusion or retraction  AS: external normal, ear canal normal, TM intact without effusion or retraction  Nose: External nose normal, septum midline, no inferior turbinate hypertrophy, no epistaxis  Oral Cavity & Oropharynx: Lips normal. Tongue without masses or lesions. Dentition fair. Oropharynx unremarkable. No masses, lesions, or leukoplakia. Floor of mouth and base of tongue are soft.   Neck: Soft, non-tender, no palpable lymph nodes. Thyroid without nodules or goiter.   Neuro: CN II - XII  intact  Psychiatric: oriented to time, place and person, no depression, anxiety or agitation    ASSESSMENT:  Lashell Gonzalez is a 49 y.o. female w/ hx of tonsillitis with tonsillar abscess. Doing well after abx and steroids.     PLAN:  -- Observation for tonsil infections. No indication for surgery since 1st episode.,     RTC PRN    Dima Arciniega MD  [facility]  8:21 AM 06/05/2024

## 2024-06-11 NOTE — PROGRESS NOTES
I have reviewed and agree with the resident's findings, including all diagnostic interpretations and plans as written.     Brown Llanes M.D.

## 2024-07-25 ENCOUNTER — PATIENT MESSAGE (OUTPATIENT)
Dept: PSYCHIATRY | Facility: CLINIC | Age: 49
End: 2024-07-25
Payer: COMMERCIAL

## 2024-07-26 NOTE — PROGRESS NOTES
"Subjective:          Patient ID: Lashell Gonzalez is a 49 y.o. female who presents today for a routine clinic visit for MS.  She was last seen in 2021. The history has been provided by the patient.       MS HPI:  DMT: None   Taking vitamin D3 as recommended? Yes   She has been taking care of her mom and father-in-law. They are both in assisted living, and she lives close to the facility.  She had COVID recently.   She recently went to [facility] on a cruise.   She has not taken disease modifying therapy in the past 3 years. She does not feel like things have gotten worse MS-wise in the past few years. She states "I don't feel remarkably better or remarkably worse."   She would like to get back into an exercise routine. She averages about 7500 steps per day. She would like to lose 20lbs.     Medications:  Current Outpatient Medications   Medication Sig    FLECTOR 1.3 % PT12 Apply 1 patch topically every 12 (twelve) hours. Uses for hip pain    hydrocodone-acetaminophen 7.5-325mg (NORCO) 7.5-325 mg per tablet Take 1 tablet by mouth every 8 (eight) hours as needed for Pain.    loratadine 10 mg Cap Take 1 capsule by mouth once daily.     melatonin 10 mg Tab     multivitamin capsule Take 1 capsule by mouth once daily.    omeprazole (PRILOSEC OTC) 20 MG tablet Take 20 mg by mouth once daily.    pregabalin (LYRICA) 100 MG capsule TAKE 1 CAPSULE BY MOUTH FIVE TIMES DAILY    pseudoephedrine (SUDAFED) 30 MG tablet Take 30 mg by mouth every 4 (four) hours as needed.    sumatriptan (IMITREX) 100 MG tablet Take 100 mg by mouth once.     tiZANidine (ZANAFLEX) 4 MG tablet Take 4 mg by mouth every evening.    vitamin D (VITAMIN D3) 1000 units Tab Take 1,000 Units by mouth once daily.    zolpidem (AMBIEN) 10 mg Tab Take 10 mg by mouth every evening.     ferrous sulfate (IRON) 325 mg (65 mg iron) Tab tablet  (Patient not taking: Reported on 7/29/2024)    methylPREDNISolone (MEDROL DOSEPACK) 4 mg tablet Take as directed on [facility]    " "TECFIDERA 240 mg CpDR Take 1 capsule by mouth 2 (two) times daily. (Patient not taking: Reported on 7/29/2024)    tranexamic acid 650 mg Tab Take 1,300 mg by mouth. Take 2 tablets two times daily prn (Patient not taking: Reported on 7/29/2024)     No current facility-administered medications for this visit.       SOCIAL HISTORY  Social History     Tobacco Use    Smoking status: Never    Smokeless tobacco: Never   Substance Use Topics    Alcohol use: No    Drug use: Never       Living arrangements - the patient lives with her family     ROS:      7/29/24    REVIEW OF SYMPTOMS   Do you feel abnormally tired on most days? Yes--energy level is "not spectacular."    Do you feel you generally sleep well? No--sleep quality has not been great; She continues to take Ambien 5mg; she has good sleep hygiene. She is also taking melatonin 10mg.    Do you have difficulty controlling your bladder?  No--can't hold it as long; not having accidents    Do you have difficulty controlling your bowels?  No--bowels are loose sometimes    Do you have frequent muscle cramps, tightness or spasms in your limbs?  Yes--she has tightness and twitching sometimes--mostly in her pinky fingers and hips    Do you have new visual symptoms?  No   Do you have worsening difficulty with your memory or thinking? No--her mom has dementia; she manages her medications and finances (forgets to pay a bill sometimes); she has ADD and has to use a lot of reminders   Do you have worsening symptoms of anxiety or depression?  No--she is stressed; thinks that journaling or meditation would help; music helps    For patients who walk, Do you have more difficulty walking?  No--balance is not great sometimes, but no falls.    Have you fallen since your last visit?  No   For patients who use wheelchairs: Do you have any skin wounds or breakdown? No   Do you have difficulty using your hands?  No   Do you have shooting or burning pain? No--rates pain as 3/10 on most " days--manageable for the most part; worse if she is sitting or lying down in one position    Do you have difficulty with sexual function?  Yes   If you are sexually active, are you using birth control? Y/N  N/A Yes   Do you often choke when swallowing liquids or solid food?  No   Do you experience worsening symptoms when overheated? Yes--the hot weather makes her feel worse; she is perimenopausal; she can tolerate a warm bath    Do you need any new equipment such as a wheelchair, walker or shower chair? No   Do you receive co-pay financial assistance for your principal MS medicine? N/A   Would you be interested in participating in an MS research trial in the future? Yes   For patients on Gilenya, Tecfidera, Aubagio, Rituxan, Ocrevus, Tysabri, Lemtrada or Methotrexate, are you aware that you should NOT receive live virus vaccines?  N/A    Do you feel you have adequate family/friend support?  Yes--her  works a lot; her daughter helps   Do you have health insurance?   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  Not Applicable   Do you use marijuana or cannabis products? No   Have you been diagnosed with a urinary tract infection since your last visit here? No   Have you been diagnosed with a respiratory tract infection since your last visit here? COVID    Have you been to the emergency room since your last visit here? No--had tonsillitis about 7-8 weeks ago and went to the ER for peritonsillar abscess; she was treated with steroids and antibiotics   Have you been hospitalized since your last visit here?  No                Objective:        1. 25 foot timed walk:      7/29/2024    12:01 AM   Timed 25 Foot Walk:   Did patient wear an AFO? No   Was assistive device used? No   Time for 25 Foot Walk (seconds) 3.7   Time for 25 Foot Walk (seconds) 3.5       Neurologic Exam     Mental Status   Oriented to person, place, and time.   Attention: normal. Concentration:  normal.   Speech: speech is normal   Level of consciousness: alert  Knowledge: good.   Normal comprehension.     Cranial Nerves     CN II   Visual acuity: (20/20 OD and OS without correction)    CN III, IV, VI   Extraocular motions are normal.     CN V   Right facial sensation deficit: none  Left facial sensation deficit: none    CN VII   Right facial weakness: none  Left facial weakness: none    CN VIII   Hearing: intact    CN IX, X   Palate: symmetric    CN XI   Right sternocleidomastoid strength: normal  Left sternocleidomastoid strength: normal  Right trapezius strength: normal  Left trapezius strength: normal    CN XII   Tongue deviation: none    Motor Exam   Muscle bulk: normal  Overall muscle tone: normal    Strength   Right neck flexion: 5/5  Left neck flexion: 5/5  Right neck extension: 5/5  Left neck extension: 5/5  Right deltoid: 5/5  Left deltoid: 5/5  Right biceps: 5/5  Left biceps: 5/5  Right triceps: 5/5  Left triceps: 5/5  Right wrist flexion: 5/5  Left wrist flexion: 5/5  Right wrist extension: 5/5  Left wrist extension: 5/5  Right interossei: 5/5  Left interossei: 5/5  Right iliopsoas: 5/5  Left iliopsoas: 5/5  Right quadriceps: 5/5  Left quadriceps: 5/5  Right hamstrin/5  Left hamstrin/5  Right anterior tibial: 5/5  Left anterior tibial: 5/5  Right gastroc: 5/5  Left gastroc: 5/5  Left leg 5-/5 in all groups      Sensory Exam   Right arm vibration: decreased from fingers  Left arm vibration: decreased from fingers  Right leg vibration: decreased from toes  Left leg vibration: decreased from toes           Gait, Coordination, and Reflexes     Gait  Gait: normal (stable)    Coordination   Romberg: negative  Finger to nose coordination: normal  Heel to shin coordination: normal  Tandem walking coordination: normal    Tremor   Resting tremor: absent    Reflexes   Right brachioradialis: 2+  Left brachioradialis: 2+  Right biceps: 2+  Left biceps: 2+  Right triceps: 2+  Left triceps: 2+  Right  patellar: 2+  Left patellar: 2+  Right achilles: 2+  Left achilles: 2+  Right plantar: normal  Left plantar: normal  She can walk on toes and heels and hop on each foot ten times.         Imaging:     Results for orders placed during the hospital encounter of 12/02/20    MRI Brain Demyelinating Without Contrast    Impression  Findings in the supratentorial white matter in keeping with reported history of multiple sclerosis.  No definite new lesions when compared to MRI 12/11/2018.    Electronically signed by resident: Piero Henriquez  Date:    12/02/2020  Time:    11:34    Electronically signed by: Gage No MD  Date:    12/02/2020  Time:    12:08      Labs:     Lab Results   Component Value Date    DZLKPCSN93RY 124 (H) 12/02/2020    TXOKAUBZ51MM 83 05/13/2019    EFLWEGOJ06NG 50 09/21/2018       Lab Results   Component Value Date    II4DQQQW 70.5 12/02/2020    ABSOLUTECD3 1841 12/02/2020    RC1EAMMT 14.8 12/02/2020    ABSOLUTECD8 386 12/02/2020    JV1BSTJW 54.4 12/02/2020    ABSOLUTECD4 1422 (H) 12/02/2020    LABCD48 3.68 (H) 12/02/2020     Lab Results   Component Value Date    WBC 8.13 12/02/2020    RBC 4.34 12/02/2020    HGB 12.4 12/02/2020    HCT 39.7 12/02/2020    MCV 92 12/02/2020    MCH 28.6 12/02/2020    MCHC 31.2 (L) 12/02/2020    RDW 13.2 12/02/2020     12/02/2020    MPV 10.4 12/02/2020    GRAN 5.0 12/02/2020    GRAN 61.9 12/02/2020    LYMPH 2.5 12/02/2020    LYMPH 30.6 12/02/2020    MONO 0.5 12/02/2020    MONO 5.5 12/02/2020    EOS 0.1 12/02/2020    BASO 0.04 12/02/2020    EOSINOPHIL 1.4 12/02/2020    BASOPHIL 0.5 12/02/2020     Sodium   Date Value Ref Range Status   05/09/2014 142 136 - 145 mmol/L Final     Potassium   Date Value Ref Range Status   05/09/2014 3.6 3.5 - 5.1 mmol/L Final     Chloride   Date Value Ref Range Status   05/09/2014 107 95 - 110 mmol/L Final     CO2   Date Value Ref Range Status   05/09/2014 25 23 - 29 mmol/L Final     Glucose   Date Value Ref Range Status   05/09/2014  74 70 - 110 mg/dL Final     BUN   Date Value Ref Range Status   05/09/2014 18 6 - 20 mg/dL Final     Creatinine   Date Value Ref Range Status   05/09/2014 0.7 0.5 - 1.4 mg/dL Final     Calcium   Date Value Ref Range Status   05/09/2014 8.7 8.7 - 10.5 mg/dL Final     Total Protein   Date Value Ref Range Status   12/02/2020 7.3 6.0 - 8.4 g/dL Final     Albumin   Date Value Ref Range Status   12/02/2020 4.0 3.5 - 5.2 g/dL Final     Total Bilirubin   Date Value Ref Range Status   12/02/2020 0.3 0.1 - 1.0 mg/dL Final     Comment:     For infants and newborns, interpretation of results should be based  on gestational age, weight and in agreement with clinical  observations.  Premature Infant recommended reference ranges:  Up to 24 hours.............<8.0 mg/dL  Up to 48 hours............<12.0 mg/dL  3-5 days..................<15.0 mg/dL  6-29 days.................<15.0 mg/dL       Alkaline Phosphatase   Date Value Ref Range Status   12/02/2020 77 55 - 135 U/L Final     AST   Date Value Ref Range Status   12/02/2020 20 10 - 40 U/L Final     ALT   Date Value Ref Range Status   12/02/2020 15 10 - 44 U/L Final     Anion Gap   Date Value Ref Range Status   05/09/2014 10 8 - 16 mmol/L Final     eGFR if    Date Value Ref Range Status   05/09/2014 >60.0 >60 mL/min/1.73 m^2 Final     eGFR if non    Date Value Ref Range Status   05/09/2014 >60.0 >60 mL/min/1.73 m^2 Final     Comment:     Calculation used to obtain the estimated glomerular filtration  rate (eGFR) is the CKD-EPI equation. Since race is unknown   in our information system, the eGFR values for   -American and Non--American patients are given   for each creatinine result.         MS Impression and Plan:     NEURO MULTIPLE SCLEROSIS IMPRESSION:   Number of relapses in the past year?:  0  Clinical Progression:  Clinically Stable  Clinical Progression comment:  Exam is mostly stable besides slightly less than full strength in  the left leg and diminished vibration sense in hands and feet.   MS Classification:  Relapsing-Remitting MS  DMT comment:  We discussed disease modfying therapy options today, including Ocrevus, Briumvi, Kesmpta, Mavenclad, and Aubagio We will get new MRIs of brain and spine and then meet virtually to decide on DMT. Initially, she is leaning towards something that is not an immunosuppressant, but is open to learning more about the medications. Literature was provided to her on these medications. Once we know which direction we will go for DMT, we will plan for labs and also check Vitamin D.   Symptom Management:  Implement change in symptom management  Implement Change in Symptom Management:  Sleep (Discussed magnesium glycinate 400mg an hour before bedtime)     MRIs of brain and spine soon   I will see her virtually after MRIs.   The visit today is associated with current or anticipated ongoing medical care related to this patient's single serious condition/complex condition of multiple sclerosis.    Total time spent with patient: 45 minutes   Total time spent on encounter: 53 minutes           RODRIGUE Pham, CNS    Problem List Items Addressed This Visit    None  Visit Diagnoses       Multiple sclerosis    -  Primary    Relevant Orders    MRI Brain Demyelinating W W/O Contrast    MRI Cervical Spine Demyelinating W W/O Contrast    MRI Thoracic Spine Demyelinating W W/O Contrast

## 2024-07-29 ENCOUNTER — OFFICE VISIT (OUTPATIENT)
Dept: NEUROLOGY | Facility: CLINIC | Age: 49
End: 2024-07-29
Payer: COMMERCIAL

## 2024-07-29 ENCOUNTER — PATIENT MESSAGE (OUTPATIENT)
Dept: NEUROLOGY | Facility: CLINIC | Age: 49
End: 2024-07-29

## 2024-07-29 VITALS
WEIGHT: 180.25 LBS | SYSTOLIC BLOOD PRESSURE: 125 MMHG | DIASTOLIC BLOOD PRESSURE: 72 MMHG | HEART RATE: 92 BPM | BODY MASS INDEX: 28.23 KG/M2

## 2024-07-29 DIAGNOSIS — Z71.89 COUNSELING REGARDING GOALS OF CARE: ICD-10-CM

## 2024-07-29 DIAGNOSIS — G35 MULTIPLE SCLEROSIS: Primary | ICD-10-CM

## 2024-07-29 PROCEDURE — 99999 PR PBB SHADOW E&M-EST. PATIENT-LVL IV: CPT | Mod: PBBFAC,,, | Performed by: CLINICAL NURSE SPECIALIST

## 2024-07-29 PROCEDURE — 3078F DIAST BP <80 MM HG: CPT | Mod: CPTII,S$GLB,, | Performed by: CLINICAL NURSE SPECIALIST

## 2024-07-29 PROCEDURE — 3074F SYST BP LT 130 MM HG: CPT | Mod: CPTII,S$GLB,, | Performed by: CLINICAL NURSE SPECIALIST

## 2024-07-29 PROCEDURE — 1159F MED LIST DOCD IN RCRD: CPT | Mod: CPTII,S$GLB,, | Performed by: CLINICAL NURSE SPECIALIST

## 2024-07-29 PROCEDURE — 99215 OFFICE O/P EST HI 40 MIN: CPT | Mod: S$GLB,,, | Performed by: CLINICAL NURSE SPECIALIST

## 2024-07-29 PROCEDURE — 3008F BODY MASS INDEX DOCD: CPT | Mod: CPTII,S$GLB,, | Performed by: CLINICAL NURSE SPECIALIST

## 2024-08-05 ENCOUNTER — PATIENT MESSAGE (OUTPATIENT)
Dept: PSYCHIATRY | Facility: CLINIC | Age: 49
End: 2024-08-05
Payer: COMMERCIAL

## 2024-08-12 NOTE — PROGRESS NOTES
Subjective:          Patient ID: Lashell Gonzalez is a 49 y.o. female who presents today for a routine virtual visit for MS.  She was last seen on 7/29/24. The history has been provided by the patient.     The patient location is: her home    The chief complaint leading to consultation is: MS     Visit type: audiovisual    Face to Face time with patient: 27 minutes  40 minutes of total time spent on the encounter, which includes face to face time and non-face to face time preparing to see the patient (eg, review of tests), Obtaining and/or reviewing separately obtained history, Documenting clinical information in the electronic or other health record, Independently interpreting results (not separately reported) and communicating results to the patient/family/caregiver, or Care coordination (not separately reported).       Each patient to whom he or she provides medical services by telemedicine is:  (1) informed of the relationship between the physician and patient and the respective role of any other health care provider with respect to management of the patient; and (2) notified that he or she may decline to receive medical services by telemedicine and may withdraw from such care at any time.        MS HPI:  DMT: None   Taking vitamin D3 as recommended? Yes   Lashell would like to review MRI results and make plans for DMT.     Medications:  Current Outpatient Medications   Medication Sig    ferrous sulfate (IRON) 325 mg (65 mg iron) Tab tablet  (Patient not taking: Reported on 7/29/2024)    FLECTOR 1.3 % PT12 Apply 1 patch topically every 12 (twelve) hours. Uses for hip pain    hydrocodone-acetaminophen 7.5-325mg (NORCO) 7.5-325 mg per tablet Take 1 tablet by mouth every 8 (eight) hours as needed for Pain.    loratadine 10 mg Cap Take 1 capsule by mouth once daily.     melatonin 10 mg Tab     multivitamin capsule Take 1 capsule by mouth once daily.    omeprazole (PRILOSEC OTC) 20 MG tablet Take 20 mg by mouth  once daily.    pregabalin (LYRICA) 100 MG capsule TAKE 1 CAPSULE BY MOUTH FIVE TIMES DAILY    pseudoephedrine (SUDAFED) 30 MG tablet Take 30 mg by mouth every 4 (four) hours as needed.    sumatriptan (IMITREX) 100 MG tablet Take 100 mg by mouth once.     TECFIDERA 240 mg CpDR Take 1 capsule by mouth 2 (two) times daily. (Patient not taking: Reported on 7/29/2024)    tiZANidine (ZANAFLEX) 4 MG tablet Take 4 mg by mouth every evening.    tranexamic acid 650 mg Tab Take 1,300 mg by mouth. Take 2 tablets two times daily prn (Patient not taking: Reported on 7/29/2024)    vitamin D (VITAMIN D3) 1000 units Tab Take 1,000 Units by mouth once daily.    zolpidem (AMBIEN) 10 mg Tab Take 10 mg by mouth every evening.      No current facility-administered medications for this visit.       SOCIAL HISTORY  Social History     Tobacco Use    Smoking status: Never    Smokeless tobacco: Never   Substance Use Topics    Alcohol use: No    Drug use: Never       Living arrangements - the patient lives with her family              Objective:        1. 25 foot timed walk:      7/29/2024    12:01 AM   Timed 25 Foot Walk:   Did patient wear an AFO? No   Was assistive device used? No   Time for 25 Foot Walk (seconds) 3.7   Time for 25 Foot Walk (seconds) 3.5       Neurologic Exam    Deferred   Imaging:     Narrative & Impression  EXAMINATION:  MRI BRAIN DEMYELINATING W/ WO CONTRAST     CLINICAL HISTORY:  Multiple sclerosis     TECHNIQUE:  Multiplanar multisequence MR imaging of the brain was performed before and after the administration of 18 mL MultiHance intravenous contrast.     COMPARISON:  MR brain 12/02/2020.     FINDINGS:  INTRACRANIAL: Multiple nodular foci of T2/FLAIR hyperintense signal in the juxta cortical and periventricular supratentorial white matter.  Three lesions along the right parietal and temporal periventricular white matter are new when compared to 12/02/2020 exam.  No abnormal enhancement demonstrated.  No parenchymal  restricted diffusion.  No evidence of intracranial hemorrhage.  No extra-axial fluid collection or mass.  No intracranial mass effect.  No hydrocephalus.  Midline structures have a normal configuration.  Visualized pituitary gland and infundibulum are normal.  Visualized major intracranial vascular structures demonstrate normal flow voids and are normal in course and caliber.     SINUSES: Paranasal sinuses and mastoid air cells are clear.     ORBITS: Visualized orbits are normal.     Impression:     Moderate demyelinating plaque burden.  No evidence of active demyelination.  There are 3 chronic demyelinating plaques along the right posterior periventricular white matter which are new when compared to 12/02/2020.        Electronically signed by:Gareth Simons  Date:                                            08/19/2024  Time:                                           14:58      Narrative & Impression  EXAMINATION:  MRI CERVICAL SPINE DEMYELINATING W W/O CONTRAST     CLINICAL HISTORY:  Multiple sclerosis     TECHNIQUE:  Multisequence, multiplanar imaging of the cervical spine was performed before and after the administration of 18 mL MultiHance IV contrast.     COMPARISON:  MR cervical spine 05/29/2018.     FINDINGS:  CORD: Chronic demyelinating plaque of the anterior midline cord at T6 level.  No abnormal intrathecal enhancement.  No syrinx.  Cervicomedullary junction is normal.     ALIGNMENT: Normal.  Lateral masses of C1 and C2 are congruent.     BONES: Vertebral body heights are maintained.  Subcentimeter benign intraosseous hemangioma of the posterior aspect of T2 vertebral body.  No aggressive bone marrow signal.     PARASPINAL AREA: Normal.     CERVICAL DISC LEVELS:     C2-C3: Unremarkable.     C3-C4: Central disc protrusion causing mild central canal stenosis.  The foramina are patent.     C4-C5: Broad-based disc osteophyte causing mild central canal stenosis.  The foramina are patent.     C5-C6: Broad-based  disc osteophyte causing mild central canal stenosis.  The foramina are patent.     C6-C7: Broad-based disc osteophyte mildly indenting the ventral thecal sac.  The foramina are patent.     C7-T1: Unremarkable.     Impression:     Unchanged chronic demyelinating plaque of the anterior midline cord at C6 level.  No new lesions or active demyelination.     Multilevel spondylosis causing mild central canal stenosis at C3-C4 there are C5-C6.        Electronically signed by:Gareth Simons  Date:                                            08/19/2024  Time:                                           15:03    Narrative & Impression  EXAMINATION:  MRI THORACIC SPINE DEMYELINATING W W/O CONTRAST     CLINICAL HISTORY:  Multiple sclerosis follow-up.     TECHNIQUE:  Multisequence, multiplanar imaging of the thoracic spine was performed before and after the IV administration of 18 mL MultiHance.     COMPARISON:  MR thoracic spine 05/29/2018.     FINDINGS:  NOMENCLATURE: Twelve thoracic type rib-bearing vertebral bodies.     CORD: Subcentimeter ovoid focus of T2 hyperintense signal in the right dorsal cord at T6-T7.  Subcentimeter ovoid focus of T2 hyperintense signal in the left lateral cord at T9.  Subcentimeter ovoid focus of T2 hyperintense signal in the left dorsal cord at T6 level.  No abnormal intrathecal enhancement.     ALIGNMENT: Normal.     BONES: Vertebral body heights are maintained.  No aggressive bone marrow signal.     SPONDYLOSIS: No significant thoracic spondylosis.  No significant spinal canal or foraminal stenosis.     PARASPINAL AREA: Normal.     Impression:     There 3 subcentimeter ovoid foci of signal abnormality in the thoracic cord at T6, T6-T7, and T9 levels most consistent with sequela of chronic demyelination.  No evidence of active demyelination.     No disc herniation, stenosis, or neural impingement.        Electronically signed by:Gareth Simons  Date:                                             08/19/2024  Time:                                           15:09           Exam Ended: 08/19/24 14:49 CDT Last Resulted: 08/19/24 15:09 CDT                   Images were reviewed with the patient.     Labs:     Lab Results   Component Value Date    JZZLUKTN95CS 124 (H) 12/02/2020    QQFKZRMM27MF 83 05/13/2019    VYREOBGY06EG 50 09/21/2018     Lab Results   Component Value Date    JCVINDEX SEE COMMENT (A) 01/04/2016    JCVANTIBODY SEE COMMENT 01/04/2016     Lab Results   Component Value Date    FJ1HMCFS 70.5 12/02/2020    ABSOLUTECD3 1841 12/02/2020    QU6SZYJE 14.8 12/02/2020    ABSOLUTECD8 386 12/02/2020    ZI1RLKRV 54.4 12/02/2020    ABSOLUTECD4 1422 (H) 12/02/2020    LABCD48 3.68 (H) 12/02/2020     Lab Results   Component Value Date    WBC 8.13 12/02/2020    RBC 4.34 12/02/2020    HGB 12.4 12/02/2020    HCT 39.7 12/02/2020    MCV 92 12/02/2020    MCH 28.6 12/02/2020    MCHC 31.2 (L) 12/02/2020    RDW 13.2 12/02/2020     12/02/2020    MPV 10.4 12/02/2020    GRAN 5.0 12/02/2020    GRAN 61.9 12/02/2020    LYMPH 2.5 12/02/2020    LYMPH 30.6 12/02/2020    MONO 0.5 12/02/2020    MONO 5.5 12/02/2020    EOS 0.1 12/02/2020    BASO 0.04 12/02/2020    EOSINOPHIL 1.4 12/02/2020    BASOPHIL 0.5 12/02/2020     Sodium   Date Value Ref Range Status   05/09/2014 142 136 - 145 mmol/L Final     Potassium   Date Value Ref Range Status   05/09/2014 3.6 3.5 - 5.1 mmol/L Final     Chloride   Date Value Ref Range Status   05/09/2014 107 95 - 110 mmol/L Final     CO2   Date Value Ref Range Status   05/09/2014 25 23 - 29 mmol/L Final     Glucose   Date Value Ref Range Status   05/09/2014 74 70 - 110 mg/dL Final     BUN   Date Value Ref Range Status   05/09/2014 18 6 - 20 mg/dL Final     Creatinine   Date Value Ref Range Status   05/09/2014 0.7 0.5 - 1.4 mg/dL Final     Calcium   Date Value Ref Range Status   05/09/2014 8.7 8.7 - 10.5 mg/dL Final     Total Protein   Date Value Ref Range Status   12/02/2020 7.3 6.0 - 8.4 g/dL Final      Albumin   Date Value Ref Range Status   12/02/2020 4.0 3.5 - 5.2 g/dL Final     Total Bilirubin   Date Value Ref Range Status   12/02/2020 0.3 0.1 - 1.0 mg/dL Final     Comment:     For infants and newborns, interpretation of results should be based  on gestational age, weight and in agreement with clinical  observations.  Premature Infant recommended reference ranges:  Up to 24 hours.............<8.0 mg/dL  Up to 48 hours............<12.0 mg/dL  3-5 days..................<15.0 mg/dL  6-29 days.................<15.0 mg/dL       Alkaline Phosphatase   Date Value Ref Range Status   12/02/2020 77 55 - 135 U/L Final     AST   Date Value Ref Range Status   12/02/2020 20 10 - 40 U/L Final     ALT   Date Value Ref Range Status   12/02/2020 15 10 - 44 U/L Final     Anion Gap   Date Value Ref Range Status   05/09/2014 10 8 - 16 mmol/L Final     eGFR if    Date Value Ref Range Status   05/09/2014 >60.0 >60 mL/min/1.73 m^2 Final     eGFR if non    Date Value Ref Range Status   05/09/2014 >60.0 >60 mL/min/1.73 m^2 Final     Comment:     Calculation used to obtain the estimated glomerular filtration  rate (eGFR) is the CKD-EPI equation. Since race is unknown   in our information system, the eGFR values for   -American and Non--American patients are given   for each creatinine result.         MS Impression and Plan:     NEURO MULTIPLE SCLEROSIS IMPRESSION:   Number of relapses in the past year?:  0  Clinical Progression:  Clinically Stable  MRI Progression:  Worsened  MS Classification:  Relapsing-Remitting MS  DMT comment:  Lashell has 3 new brain lesions on MRI compared to 2020. She has been off of DMT for some time, but is willing to restart. 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 I think it is reasonable to start a less immunosuppressive treatment, such as  Jennifer, at this time and repeat MRIs in 6 months. If there continues to be disease activity, we can consider advancing DMT at that time. We discussed Aubagio at length today.  We discussed potential risks and side effects of  GI upset, elevated liver enzymes, hypertension, headache, hair thinning, and peripheral neuropathy.  She will have labs locally next week. She is aware that the Rx will be sent to [facility].       I will see her back in a virtual visit in 3 months, and she will follow up with Dr. Whiting in 6 months with MRI on the same day. MRI order is in.   The visit today is associated with current or anticipated ongoing medical care related to this patient's single serious condition/complex condition of multiple sclerosis.        RODRIGUE Pham, CNS    Problem List Items Addressed This Visit    None  Visit Diagnoses       Multiple sclerosis    -  Primary    Relevant Orders    Vitamin D    CBC auto differential    Hepatic function panel    QUANTIFERON GOLD TB    MRI Brain Demyelinating W W/O Contrast    Counseling regarding goals of care        Prophylactic immunotherapy        High risk medication use

## 2024-08-19 ENCOUNTER — HOSPITAL ENCOUNTER (OUTPATIENT)
Dept: RADIOLOGY | Facility: HOSPITAL | Age: 49
Discharge: HOME | End: 2024-08-19
Attending: CLINICAL NURSE SPECIALIST
Payer: COMMERCIAL

## 2024-08-19 DIAGNOSIS — G35 MULTIPLE SCLEROSIS: ICD-10-CM

## 2024-08-19 LAB
CREAT SERPL-MCNC: 0.81 MG/DL (ref 0.55–1.02)
GFR SERPLBLD CREATININE-BSD FMLA CKD-EPI: >60 ML/MIN/1.73/M2

## 2024-08-19 PROCEDURE — 72156 MRI NECK SPINE W/O & W/DYE: CPT | Mod: TC

## 2024-08-19 PROCEDURE — 25500020 PHARM REV CODE 255

## 2024-08-19 PROCEDURE — A9577 INJ MULTIHANCE: HCPCS

## 2024-08-19 PROCEDURE — 72157 MRI CHEST SPINE W/O & W/DYE: CPT | Mod: TC

## 2024-08-19 PROCEDURE — 82565 ASSAY OF CREATININE: CPT | Performed by: CLINICAL NURSE SPECIALIST

## 2024-08-19 PROCEDURE — 70553 MRI BRAIN STEM W/O & W/DYE: CPT | Mod: TC

## 2024-08-19 RX ADMIN — GADOBENATE DIMEGLUMINE 18 ML: 529 INJECTION, SOLUTION INTRAVENOUS at 02:08

## 2024-08-23 ENCOUNTER — OFFICE VISIT (OUTPATIENT)
Dept: NEUROLOGY | Facility: CLINIC | Age: 49
End: 2024-08-23
Payer: COMMERCIAL

## 2024-08-23 DIAGNOSIS — Z71.89 COUNSELING REGARDING GOALS OF CARE: ICD-10-CM

## 2024-08-23 DIAGNOSIS — G35 MULTIPLE SCLEROSIS: Primary | ICD-10-CM

## 2024-08-23 DIAGNOSIS — Z79.899 HIGH RISK MEDICATION USE: ICD-10-CM

## 2024-08-23 DIAGNOSIS — Z29.89 PROPHYLACTIC IMMUNOTHERAPY: ICD-10-CM

## 2024-08-23 NOTE — Clinical Note
Planning to start Aubagio; will send lab orders to be done locally next week. Christoph, she is ok with sending to [facility].

## 2024-08-23 NOTE — Clinical Note
VV with me in 3 months Please put in a recall for Dr. Whiting in 6 months, and we will also plan for brain MRI the same day.  You can go ahead and schedule MRI brain on a Monday or Tuesday in the morning in late February/early March. Thanks!

## 2024-08-26 ENCOUNTER — PATIENT MESSAGE (OUTPATIENT)
Dept: NEUROLOGY | Facility: CLINIC | Age: 49
End: 2024-08-26
Payer: COMMERCIAL

## 2024-08-27 ENCOUNTER — TELEPHONE (OUTPATIENT)
Dept: NEUROLOGY | Facility: CLINIC | Age: 49
End: 2024-08-27
Payer: COMMERCIAL

## 2024-08-27 NOTE — TELEPHONE ENCOUNTER
Spoke with pt in regards to getting her scheduled for VV with AP which is on 11/18/24. Pt is scheduled for MRI on 02/24/25 and a recall has been placed in chart to schedule follow up with BB after MRI.

## 2024-08-30 ENCOUNTER — DOCUMENTATION ONLY (OUTPATIENT)
Dept: NEUROLOGY | Facility: CLINIC | Age: 49
End: 2024-08-30
Payer: COMMERCIAL

## 2024-09-06 ENCOUNTER — TELEPHONE (OUTPATIENT)
Dept: NEUROLOGY | Facility: CLINIC | Age: 49
End: 2024-09-06
Payer: COMMERCIAL

## 2024-09-06 NOTE — TELEPHONE ENCOUNTER
Reviewed labs from 8/30/24:    Vit D=57.2--continue current dose   WBC=7.94; NYG=5667   Normal LFTs   TB Gold negative

## 2024-09-09 NOTE — TELEPHONE ENCOUNTER
Natalie Guaman APRN, CNS  Christoph Robles, PharmD; Kirstie Whiting MD  Planning to start Aubagio; will send lab orders to be done locally next week. Christoph, she is ok with sending to [facility].

## 2024-11-18 ENCOUNTER — OFFICE VISIT (OUTPATIENT)
Dept: NEUROLOGY | Facility: CLINIC | Age: 49
End: 2024-11-18
Payer: COMMERCIAL

## 2024-11-18 ENCOUNTER — PATIENT MESSAGE (OUTPATIENT)
Dept: NEUROLOGY | Facility: CLINIC | Age: 49
End: 2024-11-18

## 2024-11-18 DIAGNOSIS — G35 MULTIPLE SCLEROSIS: Primary | ICD-10-CM

## 2024-11-18 DIAGNOSIS — Z71.89 COUNSELING REGARDING GOALS OF CARE: ICD-10-CM

## 2024-11-18 DIAGNOSIS — Z29.89 PROPHYLACTIC IMMUNOTHERAPY: ICD-10-CM

## 2024-11-18 DIAGNOSIS — Z79.899 HIGH RISK MEDICATION USE: ICD-10-CM

## 2024-11-18 PROCEDURE — 99213 OFFICE O/P EST LOW 20 MIN: CPT | Mod: 95,,, | Performed by: CLINICAL NURSE SPECIALIST

## 2024-11-18 PROCEDURE — 1159F MED LIST DOCD IN RCRD: CPT | Mod: CPTII,95,, | Performed by: CLINICAL NURSE SPECIALIST

## 2024-11-18 RX ORDER — MEDROXYPROGESTERONE ACETATE 10 MG/1
10 TABLET ORAL DAILY
COMMUNITY

## 2024-11-18 RX ORDER — BETAMETHASONE DIPROPIONATE 0.5 MG/G
CREAM TOPICAL 2 TIMES DAILY
COMMUNITY

## 2024-11-18 NOTE — PROGRESS NOTES
Subjective:          Patient ID: Lashell Gonzalez is a 49 y.o. female who presents today for a routine virtual visit for MS.  She was last seen in August 2024. The history has been provided by the patient.     The patient location is: her home   The chief complaint leading to consultation is: MS     Visit type: audiovisual    Face to Face time with patient: 19 minutes   28 minutes of total time spent on the encounter, which includes face to face time and non-face to face time preparing to see the patient (eg, review of tests), Obtaining and/or reviewing separately obtained history, Documenting clinical information in the electronic or other health record, Independently interpreting results (not separately reported) and communicating results to the patient/family/caregiver, or Care coordination (not separately reported).         Each patient to whom he or she provides medical services by telemedicine is:  (1) informed of the relationship between the physician and patient and the respective role of any other health care provider with respect to management of the patient; and (2) notified that he or she may decline to receive medical services by telemedicine and may withdraw from such care at any time.       MS HPI:  DMT: Teriflunomide--started about 2 weeks ago  Side effects from DMT? More headaches than usual since starting; different than her regular headaches. No hair thinning.   Taking vitamin D3 as recommended? 1000 units daily   She sees pain management, but not a headache specialist.   She denies any recent infections.   She is seeing dermatology for a possible pre-cancerous lesion on her nose.   Her periods have been irregular. She is taking medroxyprogesterone when she is on her cycle.   Fatigue has been worse for months, so she does not attribute this to Aubagio.   She denies any significant new MS symptoms.     Medications:  Current Outpatient Medications   Medication Sig    augmented betamethasone  dipropionate (DIPROLENE-AF) 0.05 % cream Apply topically 2 (two) times daily. Monday through Thursday    FLECTOR 1.3 % PT12 Apply 1 patch topically every 12 (twelve) hours. Uses for hip pain    hydrocodone-acetaminophen 7.5-325mg (NORCO) 7.5-325 mg per tablet Take 1 tablet by mouth every 8 (eight) hours as needed for Pain.    medroxyPROGESTERone (PROVERA) 10 MG tablet Take 10 mg by mouth once daily. Only when on her menstrual cycle    melatonin 10 mg Tab     multivitamin capsule Take 1 capsule by mouth once daily.    omeprazole (PRILOSEC OTC) 20 MG tablet Take 20 mg by mouth once daily.    pregabalin (LYRICA) 100 MG capsule TAKE 1 CAPSULE BY MOUTH FIVE TIMES DAILY (Patient taking differently: TAKE 1 CAPSULE BY MOUTH FIVE TIMES DAILY; generally takes 3-4 times daily)    pseudoephedrine (SUDAFED) 30 MG tablet Take 30 mg by mouth every 4 (four) hours as needed.    sumatriptan (IMITREX) 100 MG tablet Take 100 mg by mouth once.     teriflunomide (AUBAGIO) 14 mg Tab Take 14 mg by mouth once daily.    tiZANidine (ZANAFLEX) 4 MG tablet Take 4 mg by mouth every evening.    vitamin D (VITAMIN D3) 1000 units Tab Take 1,000 Units by mouth once daily.    zolpidem (AMBIEN) 5 MG Tab Take 5 mg by mouth every evening.    ferrous sulfate (IRON) 325 mg (65 mg iron) Tab tablet  (Patient not taking: Reported on 11/18/2024)     No current facility-administered medications for this visit.       SOCIAL HISTORY  Social History     Tobacco Use    Smoking status: Never    Smokeless tobacco: Never   Substance Use Topics    Alcohol use: No    Drug use: Never     The patient lives with her family.         Objective:        1. 25 foot timed walk:      7/29/2024    11:30 AM   Timed 25 Foot Walk:   Did patient wear an AFO? No   Was assistive device used? No   Time for 25 Foot Walk (seconds) 3.7   Time for 25 Foot Walk (seconds) 3.5       Neurological Exam    Deferred due to virtual visit   Imaging:       Results for orders placed during the  hospital encounter of 08/19/24    MRI Brain Demyelinating W W/O Contrast    Impression  Moderate demyelinating plaque burden.  No evidence of active demyelination.  There are 3 chronic demyelinating plaques along the right posterior periventricular white matter which are new when compared to 12/02/2020.      Electronically signed by: Gareth Simons  Date:    08/19/2024  Time:    14:58    Results for orders placed during the hospital encounter of 08/19/24    MRI Cervical Spine Demyelinating W W/O Contrast    Impression  Unchanged chronic demyelinating plaque of the anterior midline cord at C6 level.  No new lesions or active demyelination.    Multilevel spondylosis causing mild central canal stenosis at C3-C4 there are C5-C6.      Electronically signed by: Gareth Simons  Date:    08/19/2024  Time:    15:03    Results for orders placed during the hospital encounter of 08/19/24    MRI Thoracic Spine Demyelinating W W/O Contrast    Impression  There 3 subcentimeter ovoid foci of signal abnormality in the thoracic cord at T6, T6-T7, and T9 levels most consistent with sequela of chronic demyelination.  No evidence of active demyelination.    No disc herniation, stenosis, or neural impingement.      Electronically signed by: Gareth Simons  Date:    08/19/2024  Time:    15:09        Labs:     Lab Results   Component Value Date    HHMEPOHV85AU 124 (H) 12/02/2020    LSINBBFM97KE 83 05/13/2019    MEFGYXFZ33WT 50 09/21/2018       Lab Results   Component Value Date    ND4LJQBU 70.5 12/02/2020    ABSOLUTECD3 1841 12/02/2020    SB7AWVSE 14.8 12/02/2020    ABSOLUTECD8 386 12/02/2020    MN2AHTSD 54.4 12/02/2020    ABSOLUTECD4 1422 (H) 12/02/2020    LABCD48 3.68 (H) 12/02/2020     Lab Results   Component Value Date    WBC 8.13 12/02/2020    RBC 4.34 12/02/2020    HGB 12.4 12/02/2020    HCT 39.7 12/02/2020    MCV 92 12/02/2020    MCH 28.6 12/02/2020    MCHC 31.2 (L) 12/02/2020    RDW 13.2 12/02/2020     12/02/2020    MPV 10.4  12/02/2020    GRAN 5.0 12/02/2020    GRAN 61.9 12/02/2020    LYMPH 2.5 12/02/2020    LYMPH 30.6 12/02/2020    MONO 0.5 12/02/2020    MONO 5.5 12/02/2020    EOS 0.1 12/02/2020    BASO 0.04 12/02/2020    EOSINOPHIL 1.4 12/02/2020    BASOPHIL 0.5 12/02/2020     Sodium   Date Value Ref Range Status   05/09/2014 142 136 - 145 mmol/L Final     Potassium   Date Value Ref Range Status   05/09/2014 3.6 3.5 - 5.1 mmol/L Final     Chloride   Date Value Ref Range Status   05/09/2014 107 95 - 110 mmol/L Final     CO2   Date Value Ref Range Status   05/09/2014 25 23 - 29 mmol/L Final     Glucose   Date Value Ref Range Status   05/09/2014 74 70 - 110 mg/dL Final     BUN   Date Value Ref Range Status   05/09/2014 18 6 - 20 mg/dL Final     Creatinine   Date Value Ref Range Status   08/19/2024 0.81 0.55 - 1.02 mg/dL Final   05/09/2014 0.7 0.5 - 1.4 mg/dL Final     Calcium   Date Value Ref Range Status   05/09/2014 8.7 8.7 - 10.5 mg/dL Final     Total Protein   Date Value Ref Range Status   12/02/2020 7.3 6.0 - 8.4 g/dL Final     Albumin   Date Value Ref Range Status   12/02/2020 4.0 3.5 - 5.2 g/dL Final     Total Bilirubin   Date Value Ref Range Status   12/02/2020 0.3 0.1 - 1.0 mg/dL Final     Comment:     For infants and newborns, interpretation of results should be based  on gestational age, weight and in agreement with clinical  observations.  Premature Infant recommended reference ranges:  Up to 24 hours.............<8.0 mg/dL  Up to 48 hours............<12.0 mg/dL  3-5 days..................<15.0 mg/dL  6-29 days.................<15.0 mg/dL       Alkaline Phosphatase   Date Value Ref Range Status   12/02/2020 77 55 - 135 U/L Final     AST   Date Value Ref Range Status   12/02/2020 20 10 - 40 U/L Final     ALT   Date Value Ref Range Status   12/02/2020 15 10 - 44 U/L Final     Anion Gap   Date Value Ref Range Status   05/09/2014 10 8 - 16 mmol/L Final     eGFR if    Date Value Ref Range Status   05/09/2014 >60.0  >60 mL/min/1.73 m^2 Final     eGFR if non    Date Value Ref Range Status   05/09/2014 >60.0 >60 mL/min/1.73 m^2 Final     Comment:     Calculation used to obtain the estimated glomerular filtration  rate (eGFR) is the CKD-EPI equation. Since race is unknown   in our information system, the eGFR values for   -American and Non--American patients are given   for each creatinine result.           MS Impression and Plan:     NEURO MULTIPLE SCLEROSIS IMPRESSION:   Clinical Progression:  Clinically Stable  MS Classification:  Relapsing-Remitting MS  Current DMT: terifluomide  Current DMT comment: Continue Teriflunomide and Vitamin D.   DMT:  No change in management  DMT comment:  Monthly labs are planned for teriflunomide.   Symptom Management:  Implement change in symptom management  Implement Change in Symptom Management:  Fatigue  Implement Change in Symptom Management comment: Discussed Co-Q-10 500mg in the morning for fatigue.   Additional Impressions:   We will plan for new brain MRI in early May. We will try to coordinate this with an appt here in the MS clinic.     The visit today is associated with current or anticipated ongoing medical care related to this patient's single serious condition/complex condition of multiple sclerosis.        RODRIGUE Pham, CNS        Problem List Items Addressed This Visit    None  Visit Diagnoses       Multiple sclerosis    -  Primary    Relevant Orders    MRI Brain Demyelinating W W/O Contrast    Counseling regarding goals of care        Prophylactic immunotherapy        High risk medication use

## 2024-11-21 ENCOUNTER — TELEPHONE (OUTPATIENT)
Dept: NEUROLOGY | Facility: CLINIC | Age: 49
End: 2024-11-21
Payer: COMMERCIAL

## 2024-11-21 NOTE — TELEPHONE ENCOUNTER
----- Message from Natalie Guaman sent at 11/18/2024  3:47 PM CST -----  MRI brain and f/u visit with BB on the same day in early May

## 2024-11-21 NOTE — TELEPHONE ENCOUNTER
Spoke with pt in regards to getting her schedule for MRI and f/u, pt is scheduled for MRI on 05/7/25 and recall has been placed in chart for May 2025.

## 2024-12-16 ENCOUNTER — PATIENT MESSAGE (OUTPATIENT)
Dept: PSYCHIATRY | Facility: CLINIC | Age: 49
End: 2024-12-16
Payer: COMMERCIAL

## 2025-01-16 ENCOUNTER — TELEPHONE (OUTPATIENT)
Dept: NEUROLOGY | Facility: CLINIC | Age: 50
End: 2025-01-16
Payer: COMMERCIAL

## 2025-01-31 ENCOUNTER — PATIENT MESSAGE (OUTPATIENT)
Dept: NEUROLOGY | Facility: CLINIC | Age: 50
End: 2025-01-31
Payer: COMMERCIAL

## 2025-01-31 DIAGNOSIS — G35 MULTIPLE SCLEROSIS: ICD-10-CM

## 2025-01-31 RX ORDER — TERIFLUNOMIDE 14 MG/1
14 TABLET, FILM COATED ORAL DAILY
Qty: 90 TABLET | Refills: 0 | Status: SHIPPED | OUTPATIENT
Start: 2025-01-31 | End: 2026-01-31

## 2025-02-14 ENCOUNTER — DOCUMENTATION ONLY (OUTPATIENT)
Dept: NEUROLOGY | Facility: CLINIC | Age: 50
End: 2025-02-14
Payer: COMMERCIAL

## 2025-03-07 ENCOUNTER — PATIENT MESSAGE (OUTPATIENT)
Dept: PSYCHIATRY | Facility: CLINIC | Age: 50
End: 2025-03-07
Payer: COMMERCIAL

## 2025-03-14 ENCOUNTER — PATIENT MESSAGE (OUTPATIENT)
Dept: NEUROLOGY | Facility: CLINIC | Age: 50
End: 2025-03-14
Payer: COMMERCIAL

## 2025-03-25 ENCOUNTER — DOCUMENTATION ONLY (OUTPATIENT)
Dept: NEUROLOGY | Facility: CLINIC | Age: 50
End: 2025-03-25
Payer: COMMERCIAL

## 2025-04-11 ENCOUNTER — DOCUMENTATION ONLY (OUTPATIENT)
Dept: NEUROLOGY | Facility: CLINIC | Age: 50
End: 2025-04-11
Payer: COMMERCIAL

## 2025-04-30 ENCOUNTER — PATIENT MESSAGE (OUTPATIENT)
Dept: NEUROLOGY | Facility: CLINIC | Age: 50
End: 2025-04-30
Payer: COMMERCIAL

## 2025-04-30 DIAGNOSIS — G35 MULTIPLE SCLEROSIS: ICD-10-CM

## 2025-05-01 RX ORDER — TERIFLUNOMIDE 14 MG/1
14 TABLET, FILM COATED ORAL DAILY
Qty: 90 TABLET | Refills: 0 | Status: SHIPPED | OUTPATIENT
Start: 2025-05-01 | End: 2026-05-01

## 2025-05-06 ENCOUNTER — PATIENT MESSAGE (OUTPATIENT)
Dept: NEUROLOGY | Facility: CLINIC | Age: 50
End: 2025-05-06
Payer: COMMERCIAL

## 2025-05-07 ENCOUNTER — HOSPITAL ENCOUNTER (OUTPATIENT)
Dept: RADIOLOGY | Facility: HOSPITAL | Age: 50
Discharge: HOME | End: 2025-05-07
Attending: CLINICAL NURSE SPECIALIST
Payer: COMMERCIAL

## 2025-05-07 DIAGNOSIS — G35 MULTIPLE SCLEROSIS: ICD-10-CM

## 2025-05-07 PROCEDURE — 70553 MRI BRAIN STEM W/O & W/DYE: CPT | Mod: TC

## 2025-05-07 PROCEDURE — 25500020 PHARM REV CODE 255: Performed by: CLINICAL NURSE SPECIALIST

## 2025-05-07 PROCEDURE — A9585 GADOBUTROL INJECTION: HCPCS | Performed by: CLINICAL NURSE SPECIALIST

## 2025-05-07 PROCEDURE — 70553 MRI BRAIN STEM W/O & W/DYE: CPT | Mod: 26,,, | Performed by: RADIOLOGY

## 2025-05-07 RX ORDER — GADOBUTROL 604.72 MG/ML
9 INJECTION INTRAVENOUS
Status: COMPLETED | OUTPATIENT
Start: 2025-05-07 | End: 2025-05-07

## 2025-05-07 RX ADMIN — GADOBUTROL 9 ML: 604.72 INJECTION INTRAVENOUS at 11:05

## 2025-05-09 ENCOUNTER — RESULTS FOLLOW-UP (OUTPATIENT)
Dept: NEUROLOGY | Facility: CLINIC | Age: 50
End: 2025-05-09
Payer: COMMERCIAL

## 2025-05-13 ENCOUNTER — PATIENT MESSAGE (OUTPATIENT)
Dept: PSYCHIATRY | Facility: CLINIC | Age: 50
End: 2025-05-13
Payer: COMMERCIAL

## 2025-05-15 ENCOUNTER — PATIENT MESSAGE (OUTPATIENT)
Dept: NEUROLOGY | Facility: CLINIC | Age: 50
End: 2025-05-15
Payer: COMMERCIAL

## 2025-05-23 ENCOUNTER — DOCUMENTATION ONLY (OUTPATIENT)
Dept: NEUROLOGY | Facility: CLINIC | Age: 50
End: 2025-05-23
Payer: COMMERCIAL

## 2025-06-16 NOTE — PROGRESS NOTES
Subjective:          Patient ID: Lashell Gonzalez is a 50 y.o. female who presents today for a routine clinic visit for MS.  She was last seen in November 2024. The history has been provided by the patient.     MS HPI:  DMT: Teriflunomide   Side effects from DMT? No--wonders if it is as effective as Tecfidera was for her.   Taking vitamin D3 as recommended? Yes   She did have increased headaches for a while, but these are better now.   Her fatigue has been worse. However, she is going through perimenopause. She is taking medroxyprogesterone from her OBGYN. She had a very heavy period last weekend. She is also very heat sensitive. She takes Co-Q-10. She felt a benefit at first, but has stopped noticing an improvement in her fatigue.   She has had ADHD since she was a kid. She has task paralysis, which limits her from getting things done. She is interested in starting a stimulant for ADHD/fatigue.   She does not feel like her sleep is restful. She has become more dependent on Ambien.   She has to put everything in her phone to help her remember.   She denies any recent infections.   She is having some bowel variability--fluctuates between soft/foamy stool and constipation. She is taking a daily prebiotic and focusing on her diet. She has had one bowel accident. She is taking omeprazole for known duodenal ulcers. She has never done a colonoscopy.   She has not been exercising much in the past month or so. Fatigue is limiting her.     Medications:  Current Outpatient Medications   Medication Sig    augmented betamethasone dipropionate (DIPROLENE-AF) 0.05 % cream Apply topically 2 (two) times daily. Monday through Thursday    ferrous sulfate (IRON) 325 mg (65 mg iron) Tab tablet  (Patient not taking: Reported on 11/18/2024)    FLECTOR 1.3 % PT12 Apply 1 patch topically every 12 (twelve) hours. Uses for hip pain    hydrocodone-acetaminophen 7.5-325mg (NORCO) 7.5-325 mg per tablet Take 1 tablet by mouth every 8 (eight)  "hours as needed for Pain.    medroxyPROGESTERone (PROVERA) 10 MG tablet Take 10 mg by mouth once daily. Only when on her menstrual cycle    melatonin 10 mg Tab     multivitamin capsule Take 1 capsule by mouth once daily.    omeprazole (PRILOSEC OTC) 20 MG tablet Take 20 mg by mouth once daily.    pregabalin (LYRICA) 100 MG capsule TAKE 1 CAPSULE BY MOUTH FIVE TIMES DAILY (Patient taking differently: TAKE 1 CAPSULE BY MOUTH FIVE TIMES DAILY; generally takes 3-4 times daily)    pseudoephedrine (SUDAFED) 30 MG tablet Take 30 mg by mouth every 4 (four) hours as needed.    sumatriptan (IMITREX) 100 MG tablet Take 100 mg by mouth once.     teriflunomide (AUBAGIO) 14 mg Tab Take 14 mg by mouth once daily.    tiZANidine (ZANAFLEX) 4 MG tablet Take 4 mg by mouth every evening.    vitamin D (VITAMIN D3) 1000 units Tab Take 1,000 Units by mouth once daily.    zolpidem (AMBIEN) 5 MG Tab Take 5 mg by mouth every evening.     No current facility-administered medications for this visit.       SOCIAL HISTORY  Social History[1]    Living arrangements - the patient lives with her family     ROS:      6/18/2025     9:34 AM   REVIEW OF SYMPTOMS   Do you feel abnormally tired on most days? Yes--as above    Do you feel you generally sleep well? No   Do you have difficulty controlling your bladder?  Yes   Do you have difficulty controlling your bowels?  No--as above    Do you have frequent muscle cramps, tightness or spasms in your limbs?  Yes--feels tightness in her neck. She tries to remember to stretch and use the TENS unit, which do help.    Do you have new visual symptoms?  No   Do you have worsening difficulty with your memory or thinking? No--She feels "a little foggy." She thinks there may be a decline, but it is not "insurmountable." She needs a reminder to help her remember things she needs to do.    Do you have worsening symptoms of anxiety or depression?  No--she does not think she is depressed, but she does have stress. She " "takes care of her mother and her father-in-law who has dementia, along with her own household.    For patients who walk, Do you have more difficulty walking?  No   Have you fallen since your last visit?  No   For patients who use wheelchairs: Do you have any skin wounds or breakdown? Not Applicable   Do you have difficulty using your hands?  No--feels a little clumsy sometimes    Do you have shooting or burning pain? No   Do you have difficulty with sexual function?  Yes   If you are sexually active, are you using birth control? Y/N  N/A Yes   Do you often choke when swallowing liquids or solid food?  No   Do you experience worsening symptoms when overheated? Yes   Do you need any new equipment such as a wheelchair, walker or shower chair? No   Do you receive co-pay financial assistance for your principal MS medicine? No   Would you be interested in participating in an MS research trial in the future? Yes   For patients on Gilenya, Tecfidera, Aubagio, Rituxan, Ocrevus, Tysabri, Lemtrada or Methotrexate, are you aware that you should NOT receive live virus vaccines?  Yes   Do you feel you have adequate family/friend support?  Yes--"could be better."    Do you have health insurance?   Yes   Are you currently employed? No   Do you receive SSDI/SSI?  No   Do you use marijuana or cannabis products? No   Have you been diagnosed with a urinary tract infection since your last visit here? No   Have you been diagnosed with a respiratory tract infection since your last visit here? No   Have you been to the emergency room since your last visit here? Yes   Have you been hospitalized since your last visit here?  No            Objective:        1. 25 foot timed walk:      7/29/2024    11:30 AM   Timed 25 Foot Walk:   Did patient wear an AFO? No   Was assistive device used? No   Time for 25 Foot Walk (seconds) 3.7   Time for 25 Foot Walk (seconds) 3.5       Neurological Exam    Mental Status   Oriented to person, place, and time. "   Attention: normal. Concentration: normal.   Speech: speech is normal   Level of consciousness: alert  Knowledge: good.   Normal comprehension.      Cranial Nerves      CN II   Visual acuity: (20/20 OD and OS without correction)     CN V   Right facial sensation deficit: none  Left facial sensation deficit: none     CN VII   Right facial weakness: none  Left facial weakness: none     CN VIII   Hearing: intact     CN IX, X   Palate: symmetric     CN XI   Right sternocleidomastoid strength: normal  Left sternocleidomastoid strength: normal  Right trapezius strength: normal  Left trapezius strength: normal     CN XII   Tongue deviation: none     Motor Exam   Muscle bulk: normal  Overall muscle tone: normal     Strength   Right neck flexion: 5/5  Left neck flexion: 5/5  Right neck extension: 5/5  Left neck extension: 5/5  Right deltoid: 5/5  Left deltoid: 5/5  Right biceps: 5/5  Left biceps: 5/5  Right triceps: 5/5  Left triceps: 5/5  Right wrist flexion: 5/5  Left wrist flexion: 5/5  Right wrist extension: 5/5  Left wrist extension: 5/5  Right interossei: 5/5  Left interossei: 5/5  Right iliopsoas: 5/5  Left iliopsoas: 5/5  Right quadriceps: 5/5  Left quadriceps: 5/5  Right hamstrin/5  Left hamstrin/5  Right anterior tibial: 5/5  Left anterior tibial: 5/5  Right gastroc: 5/5  Left gastroc: 5/5       Sensory Exam   Right arm vibration: normal  Left arm vibration: normal  Right leg vibration: decreased from toes  Left leg vibration: decreased from toes        Gait, Coordination, and Reflexes      Gait  Gait: normal (stable)     Coordination   Romberg: negative  Finger to nose coordination: normal  Heel to shin coordination: normal  Tandem walking coordination: normal     Tremor   Resting tremor: absent     Reflexes   Right brachioradialis: 2+  Left brachioradialis: 2+  Right biceps: 2+  Left biceps: 2+  Right triceps: 2+  Left triceps: 2+  Right patellar: 2+  Left patellar: 2+  Right achilles: 2+  Left achilles:  2+  Right plantar: normal  Left plantar: normal  She can walk on toes and heels and hop on each foot ten times.            Imaging:       Results for orders placed during the hospital encounter of 05/07/25    MRI Brain Demyelinating W W/O Contrast    Impression  Brain appears unchanged from prior exam, again demonstrating findings compatible with the reported history of multiple sclerosis.  No new or enhancing lesions to indicate ongoing or active demyelination.      Electronically signed by: Toby Palm MD  Date:    05/07/2025  Time:    12:28    Images and results were reviewed with the patient.   Labs:     Lab Results   Component Value Date    UQMGLSUF77BB 124 (H) 12/02/2020    BAQCVGZI53CH 83 05/13/2019    JNIQAIHD86DR 50 09/21/2018       Lab Results   Component Value Date    ET1ASVNO 70.5 12/02/2020    ABSOLUTECD3 1841 12/02/2020    AN2MLZWH 14.8 12/02/2020    ABSOLUTECD8 386 12/02/2020    JO4ZCHTO 54.4 12/02/2020    ABSOLUTECD4 1422 (H) 12/02/2020    LABCD48 3.68 (H) 12/02/2020     Lab Results   Component Value Date    WBC 8.13 12/02/2020    RBC 4.34 12/02/2020    HGB 12.4 12/02/2020    HCT 39.7 12/02/2020    MCV 92 12/02/2020    MCH 28.6 12/02/2020    MCHC 31.2 (L) 12/02/2020    RDW 13.2 12/02/2020     12/02/2020    MPV 10.4 12/02/2020    GRAN 5.0 12/02/2020    GRAN 61.9 12/02/2020    LYMPH 2.5 12/02/2020    LYMPH 30.6 12/02/2020    MONO 0.5 12/02/2020    MONO 5.5 12/02/2020    EOS 0.1 12/02/2020    BASO 0.04 12/02/2020    EOSINOPHIL 1.4 12/02/2020    BASOPHIL 0.5 12/02/2020     Sodium   Date Value Ref Range Status   05/09/2014 142 136 - 145 mmol/L Final     Potassium   Date Value Ref Range Status   05/09/2014 3.6 3.5 - 5.1 mmol/L Final     Chloride   Date Value Ref Range Status   05/09/2014 107 95 - 110 mmol/L Final     CO2   Date Value Ref Range Status   05/09/2014 25 23 - 29 mmol/L Final     Glucose   Date Value Ref Range Status   05/09/2014 74 70 - 110 mg/dL Final     BUN   Date Value Ref Range  Status   05/09/2014 18 6 - 20 mg/dL Final     Creatinine   Date Value Ref Range Status   08/19/2024 0.81 0.55 - 1.02 mg/dL Final   05/09/2014 0.7 0.5 - 1.4 mg/dL Final     Calcium   Date Value Ref Range Status   05/09/2014 8.7 8.7 - 10.5 mg/dL Final     Total Protein   Date Value Ref Range Status   12/02/2020 7.3 6.0 - 8.4 g/dL Final     Albumin   Date Value Ref Range Status   12/02/2020 4.0 3.5 - 5.2 g/dL Final     Total Bilirubin   Date Value Ref Range Status   12/02/2020 0.3 0.1 - 1.0 mg/dL Final     Comment:     For infants and newborns, interpretation of results should be based  on gestational age, weight and in agreement with clinical  observations.  Premature Infant recommended reference ranges:  Up to 24 hours.............<8.0 mg/dL  Up to 48 hours............<12.0 mg/dL  3-5 days..................<15.0 mg/dL  6-29 days.................<15.0 mg/dL       Alkaline Phosphatase   Date Value Ref Range Status   12/02/2020 77 55 - 135 U/L Final     AST   Date Value Ref Range Status   12/02/2020 20 10 - 40 U/L Final     ALT   Date Value Ref Range Status   12/02/2020 15 10 - 44 U/L Final     Anion Gap   Date Value Ref Range Status   05/09/2014 10 8 - 16 mmol/L Final     eGFR if    Date Value Ref Range Status   05/09/2014 >60.0 >60 mL/min/1.73 m^2 Final     eGFR if non    Date Value Ref Range Status   05/09/2014 >60.0 >60 mL/min/1.73 m^2 Final     Comment:     Calculation used to obtain the estimated glomerular filtration  rate (eGFR) is the CKD-EPI equation. Since race is unknown   in our information system, the eGFR values for   -American and Non--American patients are given   for each creatinine result.       MS Impression and Plan:     NEURO MULTIPLE SCLEROSIS IMPRESSION:   Number of relapses in the past year? comment:  Lashell has felt much more fatigued lately, but I think this is a combination of MS fatigue, heat, and perimenopause. She also has known ADHD that may  not be controlled, limiting her ability to get things done and be productive. I will refer to psychiatry for ADHD medication management. She will let me know where to send the referral.   Clinical Progression:  Clinically Stable  MRI Progression:  Stable  MS Classification:  Relapsing-Remitting MS  Current DMT: terifluomide  Current DMT comment: Continue teriflunomide and Vitamin D. We will check CBC and LFTs today.   DMT:  No change in management  Symptom Management:  Implement change in symptom management  Implement Change in Symptom Management:  Fatigue  Implement Change in Symptom Management comment: We will check iron and B12 today to screen for alternative causes of fatigue.   Additional Impressions:   Next brain MRI is due in May 2026.     She will follow up with Dr. Henok laureano in 4 months.   The visit today is associated with current or anticipated ongoing medical care related to this patient's single serious condition/complex condition of multiple sclerosis.    Total time spent with patient: 49 minutes   Total time spent on encounter: 60 minutes          RODRIGUE Pham, CNS    Problem List Items Addressed This Visit    None  Visit Diagnoses         Multiple sclerosis    -  Primary    Relevant Orders    CBC auto differential    Vitamin D      Fatigue, unspecified type        Relevant Orders    Ferritin    Iron and TIBC    Comprehensive Metabolic Panel    Vitamin B12      Menorrhagia with irregular cycle        Relevant Orders    Ferritin    Iron and TIBC      Counseling regarding goals of care          Prophylactic immunotherapy          High risk medication use                       [1]   Social History  Tobacco Use    Smoking status: Never    Smokeless tobacco: Never   Substance Use Topics    Alcohol use: No    Drug use: Never

## 2025-06-19 ENCOUNTER — OFFICE VISIT (OUTPATIENT)
Dept: NEUROLOGY | Facility: CLINIC | Age: 50
End: 2025-06-19
Payer: COMMERCIAL

## 2025-06-19 ENCOUNTER — LAB VISIT (OUTPATIENT)
Dept: LAB | Facility: HOSPITAL | Age: 50
End: 2025-06-19
Payer: COMMERCIAL

## 2025-06-19 ENCOUNTER — PATIENT MESSAGE (OUTPATIENT)
Dept: PSYCHIATRY | Facility: CLINIC | Age: 50
End: 2025-06-19
Payer: COMMERCIAL

## 2025-06-19 VITALS
BODY MASS INDEX: 31.22 KG/M2 | WEIGHT: 198.94 LBS | SYSTOLIC BLOOD PRESSURE: 147 MMHG | DIASTOLIC BLOOD PRESSURE: 101 MMHG | HEIGHT: 67 IN

## 2025-06-19 DIAGNOSIS — R53.83 FATIGUE, UNSPECIFIED TYPE: ICD-10-CM

## 2025-06-19 DIAGNOSIS — Z71.89 COUNSELING REGARDING GOALS OF CARE: ICD-10-CM

## 2025-06-19 DIAGNOSIS — Z29.89 PROPHYLACTIC IMMUNOTHERAPY: ICD-10-CM

## 2025-06-19 DIAGNOSIS — N92.1 MENORRHAGIA WITH IRREGULAR CYCLE: ICD-10-CM

## 2025-06-19 DIAGNOSIS — G35 MULTIPLE SCLEROSIS: ICD-10-CM

## 2025-06-19 DIAGNOSIS — G35 MULTIPLE SCLEROSIS: Primary | ICD-10-CM

## 2025-06-19 DIAGNOSIS — Z79.899 HIGH RISK MEDICATION USE: ICD-10-CM

## 2025-06-19 LAB
25(OH)D3+25(OH)D2 SERPL-MCNC: 124 NG/ML (ref 30–96)
ABSOLUTE EOSINOPHIL (OHS): 0.42 K/UL
ABSOLUTE MONOCYTE (OHS): 0.61 K/UL (ref 0.3–1)
ABSOLUTE NEUTROPHIL COUNT (OHS): 3.38 K/UL (ref 1.8–7.7)
ALBUMIN SERPL BCP-MCNC: 3.8 G/DL (ref 3.5–5.2)
ALP SERPL-CCNC: 90 UNIT/L (ref 40–150)
ALT SERPL W/O P-5'-P-CCNC: 26 UNIT/L (ref 10–44)
ANION GAP (OHS): 9 MMOL/L (ref 8–16)
AST SERPL-CCNC: 23 UNIT/L (ref 11–45)
BASOPHILS # BLD AUTO: 0.07 K/UL
BASOPHILS NFR BLD AUTO: 1 %
BILIRUB SERPL-MCNC: 0.2 MG/DL (ref 0.1–1)
BUN SERPL-MCNC: 14 MG/DL (ref 6–20)
CALCIUM SERPL-MCNC: 9.6 MG/DL (ref 8.7–10.5)
CHLORIDE SERPL-SCNC: 104 MMOL/L (ref 95–110)
CO2 SERPL-SCNC: 25 MMOL/L (ref 23–29)
CREAT SERPL-MCNC: 0.7 MG/DL (ref 0.5–1.4)
ERYTHROCYTE [DISTWIDTH] IN BLOOD BY AUTOMATED COUNT: 13.1 % (ref 11.5–14.5)
FERRITIN SERPL-MCNC: 37 NG/ML (ref 20–300)
GFR SERPLBLD CREATININE-BSD FMLA CKD-EPI: >60 ML/MIN/1.73/M2
GLUCOSE SERPL-MCNC: 71 MG/DL (ref 70–110)
HCT VFR BLD AUTO: 40.3 % (ref 37–48.5)
HGB BLD-MCNC: 12.4 GM/DL (ref 12–16)
IMM GRANULOCYTES # BLD AUTO: 0.02 K/UL (ref 0–0.04)
IMM GRANULOCYTES NFR BLD AUTO: 0.3 % (ref 0–0.5)
IRON SATN MFR SERPL: 9 % (ref 20–50)
IRON SERPL-MCNC: 37 UG/DL (ref 30–160)
LYMPHOCYTES # BLD AUTO: 2.43 K/UL (ref 1–4.8)
MCH RBC QN AUTO: 28.2 PG (ref 27–31)
MCHC RBC AUTO-ENTMCNC: 30.8 G/DL (ref 32–36)
MCV RBC AUTO: 92 FL (ref 82–98)
NUCLEATED RBC (/100WBC) (OHS): 0 /100 WBC
PLATELET # BLD AUTO: 216 K/UL (ref 150–450)
PMV BLD AUTO: 11.3 FL (ref 9.2–12.9)
POTASSIUM SERPL-SCNC: 3.9 MMOL/L (ref 3.5–5.1)
PROT SERPL-MCNC: 7.2 GM/DL (ref 6–8.4)
RBC # BLD AUTO: 4.39 M/UL (ref 4–5.4)
RELATIVE EOSINOPHIL (OHS): 6.1 %
RELATIVE LYMPHOCYTE (OHS): 35.1 % (ref 18–48)
RELATIVE MONOCYTE (OHS): 8.8 % (ref 4–15)
RELATIVE NEUTROPHIL (OHS): 48.7 % (ref 38–73)
SODIUM SERPL-SCNC: 138 MMOL/L (ref 136–145)
TIBC SERPL-MCNC: 423 UG/DL (ref 250–450)
TRANSFERRIN SERPL-MCNC: 286 MG/DL (ref 200–375)
VIT B12 SERPL-MCNC: 649 PG/ML (ref 210–950)
WBC # BLD AUTO: 6.93 K/UL (ref 3.9–12.7)

## 2025-06-19 PROCEDURE — 1159F MED LIST DOCD IN RCRD: CPT | Mod: CPTII,S$GLB,, | Performed by: CLINICAL NURSE SPECIALIST

## 2025-06-19 PROCEDURE — 85025 COMPLETE CBC W/AUTO DIFF WBC: CPT

## 2025-06-19 PROCEDURE — 80053 COMPREHEN METABOLIC PANEL: CPT

## 2025-06-19 PROCEDURE — 3077F SYST BP >= 140 MM HG: CPT | Mod: CPTII,S$GLB,, | Performed by: CLINICAL NURSE SPECIALIST

## 2025-06-19 PROCEDURE — 3080F DIAST BP >= 90 MM HG: CPT | Mod: CPTII,S$GLB,, | Performed by: CLINICAL NURSE SPECIALIST

## 2025-06-19 PROCEDURE — 83540 ASSAY OF IRON: CPT

## 2025-06-19 PROCEDURE — 3008F BODY MASS INDEX DOCD: CPT | Mod: CPTII,S$GLB,, | Performed by: CLINICAL NURSE SPECIALIST

## 2025-06-19 PROCEDURE — 82306 VITAMIN D 25 HYDROXY: CPT

## 2025-06-19 PROCEDURE — 99999 PR PBB SHADOW E&M-EST. PATIENT-LVL III: CPT | Mod: PBBFAC,,, | Performed by: CLINICAL NURSE SPECIALIST

## 2025-06-19 PROCEDURE — 99215 OFFICE O/P EST HI 40 MIN: CPT | Mod: S$GLB,,, | Performed by: CLINICAL NURSE SPECIALIST

## 2025-06-19 PROCEDURE — 82728 ASSAY OF FERRITIN: CPT

## 2025-06-19 PROCEDURE — 82607 VITAMIN B-12: CPT

## 2025-06-19 PROCEDURE — G2211 COMPLEX E/M VISIT ADD ON: HCPCS | Mod: S$GLB,,, | Performed by: CLINICAL NURSE SPECIALIST

## 2025-06-19 PROCEDURE — 36415 COLL VENOUS BLD VENIPUNCTURE: CPT

## 2025-06-19 RX ORDER — CELECOXIB 200 MG/1
200 CAPSULE ORAL
COMMUNITY
Start: 2025-05-30

## 2025-06-20 ENCOUNTER — RESULTS FOLLOW-UP (OUTPATIENT)
Dept: NEUROLOGY | Facility: CLINIC | Age: 50
End: 2025-06-20

## 2025-07-10 ENCOUNTER — PATIENT MESSAGE (OUTPATIENT)
Dept: NEUROLOGY | Facility: CLINIC | Age: 50
End: 2025-07-10
Payer: COMMERCIAL

## 2025-07-10 RX ORDER — TIRZEPATIDE 2.5 MG/0.1
SYRINGE (ML) SUBCUTANEOUS
COMMUNITY

## 2025-07-10 RX ORDER — CRANBERRY FRUIT EXTRACT 650 MG
CAPSULE ORAL
COMMUNITY

## 2025-07-10 RX ORDER — NORETHINDRONE 5 MG/1
5 TABLET ORAL DAILY
COMMUNITY

## 2025-07-14 DIAGNOSIS — G35 MULTIPLE SCLEROSIS: ICD-10-CM

## 2025-07-14 RX ORDER — TERIFLUNOMIDE 14 MG/1
14 TABLET, FILM COATED ORAL DAILY
Qty: 90 TABLET | Refills: 0 | Status: SHIPPED | OUTPATIENT
Start: 2025-07-14 | End: 2026-07-14

## 2025-07-30 ENCOUNTER — PATIENT MESSAGE (OUTPATIENT)
Dept: PSYCHIATRY | Facility: CLINIC | Age: 50
End: 2025-07-30
Payer: COMMERCIAL

## 2025-08-01 ENCOUNTER — PATIENT MESSAGE (OUTPATIENT)
Dept: PSYCHIATRY | Facility: CLINIC | Age: 50
End: 2025-08-01
Payer: COMMERCIAL

## (undated) DEVICE — SHEET EENT SPLIT

## (undated) DEVICE — DRESSING TRANS 2X2 TEGADERM

## (undated) DEVICE — TRAY MUSCLE LID EYE

## (undated) DEVICE — FORCEP CURVED DISP

## (undated) DEVICE — SOL BETADINE 5%

## (undated) DEVICE — CORD BIPOLAR 12 FOOT

## (undated) DEVICE — SUT 6/0 18IN COATED VICRYL

## (undated) DEVICE — SEE MEDLINE ITEM 157128